# Patient Record
Sex: MALE | Race: WHITE | Employment: PART TIME | URBAN - METROPOLITAN AREA
[De-identification: names, ages, dates, MRNs, and addresses within clinical notes are randomized per-mention and may not be internally consistent; named-entity substitution may affect disease eponyms.]

---

## 2017-05-20 ENCOUNTER — ED HISTORICAL/CONVERTED ENCOUNTER (OUTPATIENT)
Dept: OTHER | Age: 62
End: 2017-05-20

## 2017-05-30 ENCOUNTER — OP HISTORICAL/CONVERTED ENCOUNTER (OUTPATIENT)
Dept: OTHER | Age: 62
End: 2017-05-30

## 2017-06-01 ENCOUNTER — OP HISTORICAL/CONVERTED ENCOUNTER (OUTPATIENT)
Dept: OTHER | Age: 62
End: 2017-06-01

## 2019-10-01 ENCOUNTER — IP HISTORICAL/CONVERTED ENCOUNTER (OUTPATIENT)
Dept: OTHER | Age: 64
End: 2019-10-01

## 2019-10-01 LAB
CREATININE, EXTERNAL: 0.88
HBA1C MFR BLD HPLC: 5.3 %
LDL-C, EXTERNAL: 36
PSA, EXTERNAL: 3.2

## 2019-10-24 ENCOUNTER — OFFICE VISIT (OUTPATIENT)
Dept: ENDOCRINOLOGY | Age: 64
End: 2019-10-24

## 2019-10-24 VITALS
OXYGEN SATURATION: 97 % | SYSTOLIC BLOOD PRESSURE: 137 MMHG | BODY MASS INDEX: 21.42 KG/M2 | DIASTOLIC BLOOD PRESSURE: 77 MMHG | RESPIRATION RATE: 18 BRPM | HEART RATE: 80 BPM | HEIGHT: 63 IN | WEIGHT: 120.9 LBS | TEMPERATURE: 97.3 F

## 2019-10-24 DIAGNOSIS — E04.1 THYROID NODULE: ICD-10-CM

## 2019-10-24 DIAGNOSIS — E05.90 HYPERTHYROIDISM: ICD-10-CM

## 2019-10-24 DIAGNOSIS — E05.90 HYPERTHYROIDISM: Primary | ICD-10-CM

## 2019-10-24 DIAGNOSIS — B19.20 HEPATITIS C VIRUS INFECTION WITHOUT HEPATIC COMA, UNSPECIFIED CHRONICITY: ICD-10-CM

## 2019-10-24 RX ORDER — ALLOPURINOL 100 MG/1
TABLET ORAL
Refills: 0 | COMMUNITY
Start: 2019-10-22 | End: 2020-04-22 | Stop reason: SDUPTHER

## 2019-10-24 RX ORDER — METHIMAZOLE 5 MG/1
TABLET ORAL
Refills: 0 | COMMUNITY
Start: 2019-10-03 | End: 2019-10-24

## 2019-10-24 RX ORDER — DILTIAZEM HYDROCHLORIDE 120 MG/1
CAPSULE, EXTENDED RELEASE ORAL
Refills: 0 | COMMUNITY
Start: 2019-10-03 | End: 2020-12-01

## 2019-10-24 RX ORDER — METHIMAZOLE 5 MG/1
10 TABLET ORAL 2 TIMES DAILY
Qty: 120 TAB | Refills: 6 | Status: SHIPPED | OUTPATIENT
Start: 2019-10-24 | End: 2020-04-22 | Stop reason: SDUPTHER

## 2019-10-24 RX ORDER — OMEPRAZOLE 40 MG/1
CAPSULE, DELAYED RELEASE ORAL
Refills: 0 | COMMUNITY
Start: 2019-09-30 | End: 2020-12-01

## 2019-10-24 RX ORDER — HYDROCORTISONE 25 MG/G
CREAM TOPICAL
Refills: 0 | COMMUNITY
Start: 2019-08-09

## 2019-10-24 RX ORDER — METHIMAZOLE 5 MG/1
10 TABLET ORAL 2 TIMES DAILY
Qty: 60 TAB | Refills: 6 | Status: SHIPPED | OUTPATIENT
Start: 2019-10-24 | End: 2019-10-24 | Stop reason: SDUPTHER

## 2019-10-24 RX ORDER — BETAMETHASONE DIPROPIONATE 0.5 MG/G
OINTMENT TOPICAL
Refills: 1 | COMMUNITY
Start: 2019-08-19

## 2019-10-24 NOTE — PROGRESS NOTES
Room 2    Identified pt with two pt identifiers(name and ). Reviewed record in preparation for visit and have obtained necessary documentation. All patient medications has been reviewed. Chief Complaint   Patient presents with   [address]    Thyroid Problem       Health Maintenance Due   Topic    Hepatitis C Screening     DTaP/Tdap/Td series (1 - Tdap)    Shingrix Vaccine Age 50> (1 of 2)    FOBT Q 1 YEAR AGE 50-75     Influenza Age 5 to Adult        Vitals:    10/24/19 1441   BP: 137/77   Pulse: 80   Resp: 18   Temp: 97.3 °F (36.3 °C)   TempSrc: Oral   SpO2: 97%   Weight: 120 lb 14.4 oz (54.8 kg)   Height: 5' 3\" (1.6 m)   PainSc:   0 - No pain       Wt Readings from Last 3 Encounters:   10/24/19 120 lb 14.4 oz (54.8 kg)     Temp Readings from Last 3 Encounters:   10/24/19 97.3 °F (36.3 °C) (Oral)     BP Readings from Last 3 Encounters:   10/24/19 137/77     Pulse Readings from Last 3 Encounters:   10/24/19 80       No results found for: HBA1C, HGBE8, XBH2QCHO, RFS3CLEE, GMZ5CNFO    Coordination of Care Questionnaire:   1) Have you been to an emergency room, urgent care, or hospitalized since your last visit?   no       2. Have seen or consulted any other health care provider since your last visit? NO    3) Do you have an Advanced Directive/ Living Will in place? NO  If yes, do we have a copy on file NO  If no, would you like information NO    Patient is accompanied by partner I have received verbal consent from Dariel Caicedo to discuss any/all medical information while they are present in the room.

## 2019-10-24 NOTE — LETTER
10/27/19 Patient: Allison Sanderson YOB: 1955 Date of Visit: 10/24/2019 Amena Head MD 
[address] [place] [address] VIA Facsimile: [phone] Dear Amena Head MD, Thank you for referring Mr. Marilu Cohn to [id number] [place] [address] for evaluation. My notes for this consultation are attached. If you have questions, please do not hesitate to call me. I look forward to following your patient along with you. Sincerely, Loreta Jeter MD

## 2019-10-24 NOTE — PROGRESS NOTES
HISTORY OF PRESENT ILLNESS  Danay Hoang is a 62 y.o. male.   HPI  Initial  visit for thyroid nodule   Referred by pcp       Accompanied by wife who is providing better history       Pt has been found to be losing weight and he had diarrhea , severe   Pt lives separate from his wife and son     When labs are checked, found to be hyperthyroid and got started on tapazole 5 mg tid     Pt has hepatitis c - just came to know about it   Yet to start on treatment           Past Medical History:   Diagnosis Date    Hepatitis C     Tumor, thyroid        Social History     Socioeconomic History    Marital status:      Spouse name: Not on file    Number of children: Not on file    Years of education: Not on file    Highest education level: Not on file   Occupational History    Not on file   Social Needs    Financial resource strain: Not on file    Food insecurity:     Worry: Not on file     Inability: Not on file    Transportation needs:     Medical: Not on file     Non-medical: Not on file   Tobacco Use    Smoking status: Former Smoker    Smokeless tobacco: Never Used    Tobacco comment: quit 12 years ago   Substance and Sexual Activity    Alcohol use: Not Currently    Drug use: Yes     Types: Marijuana     Comment: pt states, not often    Sexual activity: Yes     Partners: Female   Lifestyle    Physical activity:     Days per week: Not on file     Minutes per session: Not on file    Stress: Not on file   Relationships    Social connections:     Talks on phone: Not on file     Gets together: Not on file     Attends Congregational service: Not on file     Active member of club or organization: Not on file     Attends meetings of clubs or organizations: Not on file     Relationship status: Not on file    Intimate partner violence:     Fear of current or ex partner: Not on file     Emotionally abused: Not on file     Physically abused: Not on file     Forced sexual activity: Not on file   Other Topics Concern    Not on file   Social History Narrative    Not on file       History reviewed. No pertinent family history. Review of Systems   Constitutional: Negative. HENT: Negative. Eyes: Negative for pain and redness. Respiratory: Negative. Cardiovascular: Negative for chest pain, palpitations and leg swelling. Gastrointestinal: Negative. Negative for constipation. Genitourinary: Negative. Musculoskeletal: Negative for myalgias. Skin: Negative. Neurological: Negative. Endo/Heme/Allergies: Negative. Psychiatric/Behavioral: Negative for depression and memory loss. The patient does not have insomnia. Physical Exam   Constitutional: He is oriented to person, place, and time. He appears well-developed and well-nourished. HENT:   Head: Normocephalic. Eyes: Pupils are equal, round, and reactive to light. Conjunctivae and EOM are normal.   Neck: Normal range of motion. Neck supple. Thyromegaly present. Cardiovascular: Normal rate and regular rhythm. Pulmonary/Chest: Effort normal and breath sounds normal.   Abdominal: Soft. Bowel sounds are normal.   Musculoskeletal: Normal range of motion. Neurological: He is alert and oriented to person, place, and time. He has normal reflexes. Skin: Skin is warm and dry. Psychiatric: He has a normal mood and affect. Reviewed labs     TSH is < 0.006   Free T4 7.55         ASSESSMENT and PLAN    Hyperthyroidism :   Already on tapazole 5 mg tid and pt says he is taking it only twice  A  Day  , even though it was written for tid dosing   Increasing dose to 10 mg bid     Graves disease by clinical information. Reviewed USG. Could not order  thyroid uptake and scan as he is now on tapazole . Educated patient about this disease, adverse effects of untreated hyperthyroidism especially on heart and bone. Discussed options of HAMILTON therapy and anti-thyroid drugs. Pt. Seemed inteested in getting HAMILTON done.  Will have to plan for it later visits     Checking labs in office and will advise about dosage adjustment over phone. Pt. Is  on propranolol       May not participate in vigorous physical activities. lab results reviewed with patient, repeat labs ordered prior to next appointment  orders and follow up discussed with patient   very strongly urged to quit smoking, Patient instructional materials given. Thyromegaly : reviewed usg and found right  lobe nodule to be requiring FNA  usg 10/3/219  from [facility] - there are several ill-defined nodules in right lobe and the largest , measuring 1.41 by 1.08 by 0.97 cm   Solid nodule with no calcifications , wider than tall     We discussed the natural history of thyroid nodules and the fact that only 5% are malignant. I offered her 3 options: a) pursuing a fine needle aspiration of the nodule, b) expectant management with repeat ultrasound in 6 months, or c) surgical removal of the nodule. Given that he is not having any compressive symptoms from the nodule, there is no indication to go straight to surgery. I reassured him that most thyroid cancers are very slow growing and it is unlikely that we will lose anything if we wait 6 months and decide to pursue a needle biopsy at that time. However, given the size of the nodule and some suspicious characteristics on ultrasound, I think it makes most sense to pursue biopsy at this time. he is agreeable to this plan.   - scheduling  ultrasound-guided FNA of dominant nodule      Hepatitis C : yet to start on treatment       > 50 % of time is spent on counseling   Patient voiced understanding her plan of care

## 2020-01-15 ENCOUNTER — HOSPITAL ENCOUNTER (OUTPATIENT)
Dept: LAB | Age: 65
Discharge: HOME | End: 2020-01-15

## 2020-01-15 ENCOUNTER — LAB ONLY (OUTPATIENT)
Dept: ENDOCRINOLOGY | Age: 65
End: 2020-01-15

## 2020-01-15 DIAGNOSIS — E04.1 THYROID NODULE: ICD-10-CM

## 2020-01-15 DIAGNOSIS — E05.90 HYPERTHYROIDISM: Primary | ICD-10-CM

## 2020-01-15 DIAGNOSIS — E05.90 HYPERTHYROIDISM: ICD-10-CM

## 2020-01-15 LAB
ALBUMIN SERPL-MCNC: 3.6 G/DL (ref 3.5–5)
ALBUMIN/GLOB SERPL: 1.1 {RATIO} (ref 1.1–2.2)
ALP SERPL-CCNC: 88 U/L (ref 45–117)
ALT SERPL-CCNC: 18 U/L (ref 12–78)
ANION GAP SERPL CALC-SCNC: 5 MMOL/L (ref 5–15)
AST SERPL-CCNC: 11 U/L (ref 15–37)
BASOPHILS # BLD: 0 K/UL (ref 0–0.1)
BASOPHILS NFR BLD: 1 % (ref 0–1)
BILIRUB SERPL-MCNC: 0.7 MG/DL (ref 0.2–1)
BUN SERPL-MCNC: 16 MG/DL (ref 6–20)
BUN/CREAT SERPL: 15 (ref 12–20)
CALCIUM SERPL-MCNC: 9.4 MG/DL (ref 8.5–10.1)
CHLORIDE SERPL-SCNC: 103 MMOL/L (ref 97–108)
CO2 SERPL-SCNC: 30 MMOL/L (ref 21–32)
CREAT SERPL-MCNC: 1.09 MG/DL (ref 0.7–1.3)
DIFFERENTIAL METHOD BLD: ABNORMAL
EOSINOPHIL # BLD: 0.4 K/UL (ref 0–0.4)
EOSINOPHIL NFR BLD: 7 % (ref 0–7)
ERYTHROCYTE [DISTWIDTH] IN BLOOD BY AUTOMATED COUNT: 13.2 % (ref 11.5–14.5)
GLOBULIN SER CALC-MCNC: 3.4 G/DL (ref 2–4)
GLUCOSE SERPL-MCNC: 137 MG/DL (ref 65–100)
HCT VFR BLD AUTO: 49.8 % (ref 36.6–50.3)
HGB BLD-MCNC: 15.6 G/DL (ref 12.1–17)
IMM GRANULOCYTES # BLD AUTO: 0 K/UL (ref 0–0.04)
IMM GRANULOCYTES NFR BLD AUTO: 0 % (ref 0–0.5)
LYMPHOCYTES # BLD: 1.5 K/UL (ref 0.8–3.5)
LYMPHOCYTES NFR BLD: 27 % (ref 12–49)
MCH RBC QN AUTO: 31.8 PG (ref 26–34)
MCHC RBC AUTO-ENTMCNC: 31.3 G/DL (ref 30–36.5)
MCV RBC AUTO: 101.4 FL (ref 80–99)
MONOCYTES # BLD: 0.6 K/UL (ref 0–1)
MONOCYTES NFR BLD: 10 % (ref 5–13)
NEUTS SEG # BLD: 3 K/UL (ref 1.8–8)
NEUTS SEG NFR BLD: 55 % (ref 32–75)
NRBC # BLD: 0 K/UL (ref 0–0.01)
NRBC BLD-RTO: 0 PER 100 WBC
PLATELET # BLD AUTO: 207 K/UL (ref 150–400)
PMV BLD AUTO: 10.6 FL (ref 8.9–12.9)
POTASSIUM SERPL-SCNC: 4.7 MMOL/L (ref 3.5–5.1)
PROT SERPL-MCNC: 7 G/DL (ref 6.4–8.2)
RBC # BLD AUTO: 4.91 M/UL (ref 4.1–5.7)
SODIUM SERPL-SCNC: 138 MMOL/L (ref 136–145)
T4 FREE SERPL-MCNC: 0.7 NG/DL (ref 0.8–1.5)
TSH SERPL DL<=0.05 MIU/L-ACNC: 3.92 UIU/ML (ref 0.36–3.74)
WBC # BLD AUTO: 5.4 K/UL (ref 4.1–11.1)

## 2020-01-17 LAB — TSH RECEP AB SER-ACNC: 1.65 IU/L (ref 0–1.75)

## 2020-01-22 ENCOUNTER — OFFICE VISIT (OUTPATIENT)
Dept: ENDOCRINOLOGY | Age: 65
End: 2020-01-22

## 2020-01-22 VITALS
DIASTOLIC BLOOD PRESSURE: 74 MMHG | TEMPERATURE: 97.8 F | BODY MASS INDEX: 24.33 KG/M2 | SYSTOLIC BLOOD PRESSURE: 118 MMHG | HEIGHT: 63 IN | WEIGHT: 137.3 LBS | RESPIRATION RATE: 20 BRPM | HEART RATE: 65 BPM | OXYGEN SATURATION: 96 %

## 2020-01-22 DIAGNOSIS — E04.1 THYROID NODULE: ICD-10-CM

## 2020-01-22 DIAGNOSIS — E05.90 HYPERTHYROIDISM: Primary | ICD-10-CM

## 2020-01-22 DIAGNOSIS — B19.20 HEPATITIS C VIRUS INFECTION WITHOUT HEPATIC COMA, UNSPECIFIED CHRONICITY: ICD-10-CM

## 2020-01-22 RX ORDER — GLECAPREVIR AND PIBRENTASVIR 40; 100 MG/1; MG/1
TABLET, FILM COATED ORAL
COMMUNITY
Start: 2020-01-08 | End: 2020-12-01

## 2020-01-22 RX ORDER — METOPROLOL SUCCINATE 25 MG/1
TABLET, EXTENDED RELEASE ORAL DAILY
COMMUNITY
End: 2020-04-22 | Stop reason: SDUPTHER

## 2020-01-22 RX ORDER — FAMOTIDINE 20 MG/1
20 TABLET, FILM COATED ORAL AS NEEDED
COMMUNITY
End: 2022-05-06

## 2020-01-22 NOTE — PROGRESS NOTES
HISTORY OF PRESENT ILLNESS  Erica Thompson is a 62 y.o. male.   HPI  First follow up after Initial  visit for thyroid nodule   From oct 2019   Gained 17 lbs       Taking tapazole as prescribed   Here for thyroid nodule biopsy         Referred by pcp       Accompanied by wife who is providing better history     Pt has been found to be losing weight and he had diarrhea , severe   Pt lives separate from his wife and son     When labs are checked, found to be hyperthyroid and got started on tapazole 5 mg tid     Pt has hepatitis c - just came to know about it   Yet to start on treatment           Past Medical History:   Diagnosis Date    Hepatitis C     Tumor, thyroid        Social History     Socioeconomic History    Marital status:      Spouse name: Not on file    Number of children: Not on file    Years of education: Not on file    Highest education level: Not on file   Occupational History    Not on file   Social Needs    Financial resource strain: Not on file    Food insecurity:     Worry: Not on file     Inability: Not on file    Transportation needs:     Medical: Not on file     Non-medical: Not on file   Tobacco Use    Smoking status: Former Smoker    Smokeless tobacco: Never Used    Tobacco comment: quit 12 years ago   Substance and Sexual Activity    Alcohol use: Not Currently    Drug use: Yes     Types: Marijuana     Comment: pt states, not often    Sexual activity: Yes     Partners: Female   Lifestyle    Physical activity:     Days per week: Not on file     Minutes per session: Not on file    Stress: Not on file   Relationships    Social connections:     Talks on phone: Not on file     Gets together: Not on file     Attends Rastafari service: Not on file     Active member of club or organization: Not on file     Attends meetings of clubs or organizations: Not on file     Relationship status: Not on file    Intimate partner violence:     Fear of current or ex partner: Not on file     Emotionally abused: Not on file     Physically abused: Not on file     Forced sexual activity: Not on file   Other Topics Concern    Not on file   Social History Narrative    Not on file       History reviewed. No pertinent family history. Review of Systems   Constitutional: Negative. HENT: Negative. Eyes: Negative for pain and redness. Respiratory: Negative. Cardiovascular: Negative for chest pain, palpitations and leg swelling. Gastrointestinal: Negative. Negative for constipation. Genitourinary: Negative. Musculoskeletal: Negative for myalgias. Skin: Negative. Neurological: Negative. Endo/Heme/Allergies: Negative. Psychiatric/Behavioral: Negative for depression and memory loss. The patient does not have insomnia. Physical Exam   Constitutional: He is oriented to person, place, and time. He appears well-developed and well-nourished. HENT:   Head: Normocephalic. Eyes: Pupils are equal, round, and reactive to light. Conjunctivae and EOM are normal.   Neck: Normal range of motion. Neck supple. Thyromegaly present. Cardiovascular: Normal rate and regular rhythm. Pulmonary/Chest: Effort normal and breath sounds normal.   Abdominal: Soft. Bowel sounds are normal.   Musculoskeletal: Normal range of motion. Neurological: He is alert and oriented to person, place, and time. He has normal reflexes. Skin: Skin is warm and dry. Psychiatric: He has a normal mood and affect. Reviewed labs     TSH is < 0.006   Free T4 7.55         Lab Results   Component Value Date/Time    TSH 3.92 (H) 01/15/2020 12:58 PM    T4, Free 0.7 (L) 01/15/2020 12:58 PM              ASSESSMENT and PLAN    Hyperthyroidism : Graves disease by clinical information. Reviewed USG. Could not order  thyroid uptake and scan as he is now on tapazole .   Already on tapazole 5 mg tid and pt says he is taking it only twice  A  Day  , even though it was written for tid dosing   Increased  dose to 10 mg bid  In oct 2019    Jan 2020 : he is euthyroid gladly  Decrease dose to 15 mg a day   Pt.  Is  on propranolol         Thyromegaly : reviewed usg and found right  lobe nodule to be requiring FNA  usg 10/3/219  from [facility] - there are several ill-defined nodules in right lobe and the largest , measuring 1.41 by 1.08 by 0.97 cm   Solid nodule with no calcifications , wider than tall   jan 2020- performed the FNA      Hepatitis C : on treatment       > 50 % of time is spent on counseling   Patient voiced understanding her plan of care

## 2020-01-22 NOTE — PROGRESS NOTES
1. Have you been to the ER, urgent care clinic since your last visit? No  Hospitalized since your last visit? No    2. Have you seen or consulted any other health care providers outside of the [address] [place] [address] since your last visit? Include any pap smears or colon screening.  No    Wt Readings from Last 3 Encounters:   01/22/20 137 lb 4.8 oz (62.3 kg)   10/24/19 120 lb 14.4 oz (54.8 kg)     Temp Readings from Last 3 Encounters:   01/22/20 97.8 °F (36.6 °C) (Oral)   10/24/19 97.3 °F (36.3 °C) (Oral)     BP Readings from Last 3 Encounters:   01/22/20 118/74   10/24/19 137/77     Pulse Readings from Last 3 Encounters:   01/22/20 65   10/24/19 80

## 2020-01-22 NOTE — LETTER
1/22/20 Patient: Nuvia Hall YOB: 1955 Date of Visit: 1/22/2020 Jose Christensen MD 
[address] [facility] [address] VIA Facsimile: [phone] Dear Jose Christensen MD, Thank you for referring Mr. Luda Crawford to [id number] [place] [address] for evaluation. My notes for this consultation are attached. If you have questions, please do not hesitate to call me. I look forward to following your patient along with you. Sincerely, Rikki Moran MD

## 2020-01-22 NOTE — PROGRESS NOTES
[facility] DIABETES [facility] ENDOCRINOLOGY  OFFICE PROCEDURE PROGRESS NOTE        Chart reviewed for the following:   Mila Zhang MD, have reviewed the History, Physical and updated the Allergic reactions for [address] performed immediately prior to start of procedure:   Mila Zhang MD, have performed the following reviews on Bridget Cody prior to the start of the procedure:            * Patient was identified by name and date of birth   * Agreement on procedure being performed was verified  * Risks and Benefits explained to the patient  * Procedure site verified and marked as necessary  * Patient was positioned for comfort  * Consent was signed and verified     Time: 315 PM    Pain scale : 0    Date of procedure: 1/22/2020    Procedure performed by:  Agnieszka Samuel MD    Provider assisted by:   Cynthia Buck LPN      Real time imaging was performed in both transverse and sagittal planes    Nodule right   Following informed consent, a procedural pause was held to confirm patiient identity and site of biopsy. After sterile preparation, FNA guidance was performed using direct ultrasound guidance to confirm accurate needle placement. 4 aspirations were made using 25  G needles  Samples were submitted to cytology  Patient  tolerated procedure well without complications  After care instructions provided.       Pain scale post procedure : 4

## 2020-01-22 NOTE — PATIENT INSTRUCTIONS
--------------------------------------------------------------------------------------------    Refills    -    please call your pharmacy and have them send us a refill request    Results  -  allow up to a week for lab results to be processed and reviewed. Phone calls  -  Allow upto 24 hrs.  for non-urgent calls to be retained    Prior authorization - It may take up to 4 weeks to process, depending on your insurance    Forms  -  FMLA, DMV, patient assistance, etc. will take up to 2 weeks to process    Cancellations - please notify the office in advance if you cannot keep your appointment    Samples  - will only be dispensed at visits as supply is limited      If you are having a medical emergency call 911    --------------------------------------------------------------------------------------------      Decrease tapazole to 10 mg AM  And 5 mg PM      Please take tylenol 500 mg or Motrin 400 mg (2 pills of 200 mg dose) OTC,  if there is any biopsy site pain    You can go back to your normal routine immediately    If you notice any dime sized redness, at the biopsy site, it is normal and do not worry     If you have more symptoms and signs requiring emergency assistance,  call 911   or go to Emergency room

## 2020-01-23 LAB
CYTOLOGY TISS FNA DOC CYTO: NORMAL
DX ICD CODE: NORMAL
DX ICD CODE: NORMAL
PATH REPORT.GROSS SPEC: NORMAL
PATH REPORT.SITE OF ORIGIN SPEC: NORMAL
PATHOLOGIST NAME: NORMAL
PERFORMED BY, 191138: NORMAL

## 2020-04-06 ENCOUNTER — TELEPHONE (OUTPATIENT)
Dept: ENDOCRINOLOGY | Age: 65
End: 2020-04-06

## 2020-04-06 DIAGNOSIS — E04.1 THYROID NODULE: ICD-10-CM

## 2020-04-06 DIAGNOSIS — E05.90 HYPERTHYROIDISM: Primary | ICD-10-CM

## 2020-04-21 ENCOUNTER — VIRTUAL VISIT (OUTPATIENT)
Dept: ENDOCRINOLOGY | Age: 65
End: 2020-04-21

## 2020-04-21 DIAGNOSIS — E04.1 THYROID NODULE: Primary | ICD-10-CM

## 2020-04-21 DIAGNOSIS — E05.90 HYPERTHYROIDISM: ICD-10-CM

## 2020-04-21 DIAGNOSIS — B19.20 HEPATITIS C VIRUS INFECTION WITHOUT HEPATIC COMA, UNSPECIFIED CHRONICITY: ICD-10-CM

## 2020-04-21 NOTE — PROGRESS NOTES
Ana Wilkinson is a 60 y.o. male evaluated via telephone on 4/21/2020. Consent:  He and/or health care decision maker is aware that that he may receive a bill for this telephone service, depending on his insurance coverage, and has provided verbal consent to proceed: Yes    HISTORY OF PRESENT ILLNESS  Ana Wilkinson is a 60 y.o. male. HPI  First follow up after Initial  visit for thyroid nodule and hyper thyroidism from jan 22 2020    Patient was told on the visit of biopsy in January, to reduce the Tapazole to 15 mg a day from 20 mg a day.   But patient still continues to be on 20 mg a day of Tapazole  He does shift away from the main topic of discussion    He did express concern for the persisting thyroid nodule in his neck despite the negative thyroid cancer      Old history   Gained 17 lbs   Taking tapazole as prescribed   Here for thyroid nodule biopsy         Referred by pcp       Accompanied by wife who is providing better history     Pt has been found to be losing weight and he had diarrhea , severe   Pt lives separate from his wife and son     When labs are checked, found to be hyperthyroid and got started on tapazole 5 mg tid     Pt has hepatitis c - just came to know about it   Yet to start on treatment           Past Medical History:   Diagnosis Date    Hepatitis C     Tumor, thyroid        Social History     Socioeconomic History    Marital status:      Spouse name: Not on file    Number of children: Not on file    Years of education: Not on file    Highest education level: Not on file   Occupational History    Not on file   Social Needs    Financial resource strain: Not on file    Food insecurity     Worry: Not on file     Inability: Not on file    Transportation needs     Medical: Not on file     Non-medical: Not on file   Tobacco Use    Smoking status: Former Smoker    Smokeless tobacco: Never Used    Tobacco comment: quit 12 years ago   Substance and Sexual Activity    Alcohol use: Not Currently    Drug use: Yes     Types: Marijuana     Comment: pt states, not often    Sexual activity: Yes     Partners: Female   Lifestyle    Physical activity     Days per week: Not on file     Minutes per session: Not on file    Stress: Not on file   Relationships    Social connections     Talks on phone: Not on file     Gets together: Not on file     Attends Synagogue service: Not on file     Active member of club or organization: Not on file     Attends meetings of clubs or organizations: Not on file     Relationship status: Not on file    Intimate partner violence     Fear of current or ex partner: Not on file     Emotionally abused: Not on file     Physically abused: Not on file     Forced sexual activity: Not on file   Other Topics Concern    Not on file   Social History Narrative    Not on file       History reviewed. No pertinent family history. Review of Systems   Constitutional: Negative. HENT: Negative. Eyes: Negative for pain and redness. Respiratory: Negative. Cardiovascular: Negative for chest pain, palpitations and leg swelling. Gastrointestinal: Negative. Negative for constipation. Genitourinary: Negative. Musculoskeletal: Negative for myalgias. Skin: Negative. Neurological: Negative. Endo/Heme/Allergies: Negative. Psychiatric/Behavioral: Negative for depression and memory loss. The patient does not have insomnia. Physical Exam   Constitutional: He is oriented to person, place, and time. He appears well-developed and well-nourished. HENT:   Head: Normocephalic. Eyes: Pupils are equal, round, and reactive to light. Conjunctivae and EOM are normal.   Neck: Normal range of motion. Neck supple. Thyromegaly present. Cardiovascular: Normal rate and regular rhythm. Pulmonary/Chest: Effort normal and breath sounds normal.   Abdominal: Soft. Bowel sounds are normal.   Musculoskeletal: Normal range of motion.    Neurological: He is alert and oriented to person, place, and time. He has normal reflexes. Skin: Skin is warm and dry. Psychiatric: He has a normal mood and affect. Reviewed labs       Lab Results   Component Value Date/Time    TSH 3.92 (H) 01/15/2020 12:58 PM    T4, Free 0.7 (L) 01/15/2020 12:58 PM              ASSESSMENT and PLAN    Hyperthyroidism : Graves disease by clinical information. Reviewed USG. Could not order  thyroid uptake and scan as he is now on tapazole . Already on tapazole 5 mg tid and pt says he is taking it only twice  A  Day  , even though it was written for tid dosing   Increased  dose to 10 mg bid  In oct 2019    Jan 2020 : he is euthyroid gladly  Decrease  Tapazole dose to 15 mg a day ( TSH  Is  3.92 ), but  Pt  Did not change the dosing   Pt. Is  on  metoprolol       April 2020:   No labs   Again reminded him of my change on Tapazole 3 months ago which he did not follow  Decrease tapazole to 10 mg AM  And  5 mg PM          Thyromegaly : reviewed usg and found right  lobe nodule to be requiring FNA  usg 10/3/219  from [facility] - there are several ill-defined nodules in right lobe and the largest , measuring 1.41 by 1.08 by 0.97 cm   Solid nodule with no calcifications , wider than tall   jan 2020- performed the FNA -  Benign path       Hepatitis C : on treatment     Phone call  11  min       Pursuant  To the Emergency declaration under the [address] and the [address] authority and the [facility], to reduce the patient's risk of exposure to  COVID-19 and provide continuity of care for an established patient.             > 50 % of time is spent on counseling   Patient voiced understanding her plan of care

## 2020-04-21 NOTE — PROGRESS NOTES
1. Have you been to the ER, urgent care clinic since your last visit? No  Hospitalized since your last visit? No    2. Have you seen or consulted any other health care providers outside of the [address] [place] [address] since your last visit? Include any pap smears or colon screening.  No

## 2020-04-22 DIAGNOSIS — E05.90 HYPERTHYROIDISM: ICD-10-CM

## 2020-04-22 RX ORDER — ALLOPURINOL 100 MG/1
100 TABLET ORAL DAILY
Qty: 30 TAB | Refills: 6 | Status: SHIPPED | OUTPATIENT
Start: 2020-04-22

## 2020-04-22 RX ORDER — METHIMAZOLE 5 MG/1
TABLET ORAL
Qty: 90 TAB | Refills: 6 | Status: SHIPPED | OUTPATIENT
Start: 2020-04-22 | End: 2021-04-06

## 2020-04-22 RX ORDER — METOPROLOL SUCCINATE 25 MG/1
25 TABLET, EXTENDED RELEASE ORAL DAILY
Qty: 30 TAB | Refills: 6 | Status: SHIPPED | OUTPATIENT
Start: 2020-04-22

## 2020-12-01 ENCOUNTER — OFFICE VISIT (OUTPATIENT)
Dept: ENDOCRINOLOGY | Age: 65
End: 2020-12-01
Payer: COMMERCIAL

## 2020-12-01 VITALS
HEART RATE: 67 BPM | TEMPERATURE: 98.6 F | DIASTOLIC BLOOD PRESSURE: 79 MMHG | BODY MASS INDEX: 26.12 KG/M2 | OXYGEN SATURATION: 98 % | WEIGHT: 147.4 LBS | HEIGHT: 63 IN | RESPIRATION RATE: 18 BRPM | SYSTOLIC BLOOD PRESSURE: 145 MMHG

## 2020-12-01 DIAGNOSIS — B19.20 HEPATITIS C VIRUS INFECTION WITHOUT HEPATIC COMA, UNSPECIFIED CHRONICITY: ICD-10-CM

## 2020-12-01 DIAGNOSIS — E05.90 HYPERTHYROIDISM: Primary | ICD-10-CM

## 2020-12-01 DIAGNOSIS — E04.1 THYROID NODULE: ICD-10-CM

## 2020-12-01 LAB
T4 FREE SERPL-MCNC: 0.8 NG/DL (ref 0.8–1.5)
TSH SERPL DL<=0.05 MIU/L-ACNC: 7.28 UIU/ML (ref 0.36–3.74)

## 2020-12-01 PROCEDURE — 99214 OFFICE O/P EST MOD 30 MIN: CPT | Performed by: INTERNAL MEDICINE

## 2020-12-01 NOTE — LETTER
12/3/20 Patient: Shivam Lomeli YOB: 1955 Date of Visit: 12/1/2020 Luis Kowalski MD 
[place] [address] [address] [facility] [address] VIA In Basket Dear Luis Kowalski MD, Thank you for referring Mr. Stan Houston to [address] for evaluation. My notes for this consultation are attached. If you have questions, please do not hesitate to call me. I look forward to following your patient along with you. Sincerely, Maris Townsend MD

## 2020-12-01 NOTE — PROGRESS NOTES
HISTORY OF PRESENT ILLNESS  Arik Reardon is a 60 y.o. male. HPI   follow up after last   visit for thyroid nodule and hyper thyroidism from April 2020      Not a great historian  Staying compliant with Tapazole  Gained 10 lbs since jan 2020 April video visit :     Patient was told on the visit of biopsy in January, to reduce the Tapazole to 15 mg a day from 20 mg a day.   But patient still continues to be on 20 mg a day of Tapazole  He does shift away from the main topic of discussion  He did express concern for the persisting thyroid nodule in his neck despite the negative thyroid cancer      Old history   Gained 17 lbs   Taking tapazole as prescribed   Here for thyroid nodule biopsy         Referred by pcp       Accompanied by wife who is providing better history   Pt has been found to be losing weight and he had diarrhea , severe   Pt lives separate from his wife and son   When labs are checked, found to be hyperthyroid and got started on tapazole 5 mg tid   Pt has hepatitis c - just came to know about it   Yet to start on treatment           Past Medical History:   Diagnosis Date    Hepatitis C     Tumor, thyroid        Social History     Socioeconomic History    Marital status:      Spouse name: Not on file    Number of children: Not on file    Years of education: Not on file    Highest education level: Not on file   Occupational History    Not on file   Social Needs    Financial resource strain: Not on file    Food insecurity     Worry: Not on file     Inability: Not on file    Transportation needs     Medical: Not on file     Non-medical: Not on file   Tobacco Use    Smoking status: Former Smoker    Smokeless tobacco: Never Used    Tobacco comment: quit 12 years ago   Substance and Sexual Activity    Alcohol use: Not Currently    Drug use: Yes     Types: Marijuana     Comment: pt states, not often    Sexual activity: Yes     Partners: Female   Lifestyle    Physical activity     Days per week: Not on file     Minutes per session: Not on file    Stress: Not on file   Relationships    Social connections     Talks on phone: Not on file     Gets together: Not on file     Attends Hinduism service: Not on file     Active member of club or organization: Not on file     Attends meetings of clubs or organizations: Not on file     Relationship status: Not on file    Intimate partner violence     Fear of current or ex partner: Not on file     Emotionally abused: Not on file     Physically abused: Not on file     Forced sexual activity: Not on file   Other Topics Concern    Not on file   Social History Narrative    Not on file       History reviewed. No pertinent family history. Review of Systems   Constitutional: Negative. HENT: Negative. Eyes: Negative for pain and redness. Respiratory: Negative. Cardiovascular: Negative for chest pain, palpitations and leg swelling. Gastrointestinal: Negative. Negative for constipation. Genitourinary: Negative. Musculoskeletal: Negative for myalgias. Skin: Negative. Neurological: Negative. Endo/Heme/Allergies: Negative. Psychiatric/Behavioral: Negative for depression and memory loss. The patient does not have insomnia. Physical Exam   Constitutional: He is oriented to person, place, and time. He appears well-developed and well-nourished. HENT:   Head: Normocephalic. Eyes: Pupils are equal, round, and reactive to light. Conjunctivae and EOM are normal.   Neck: Normal range of motion. Neck supple. Thyromegaly present. Cardiovascular: Normal rate and regular rhythm. Pulmonary/Chest: Effort normal and breath sounds normal.   Abdominal: Soft. Bowel sounds are normal.   Musculoskeletal: Normal range of motion. Neurological: He is alert and oriented to person, place, and time. He has normal reflexes. Skin: Skin is warm and dry. Psychiatric: He has a normal mood and affect.        Reviewed labs   None New         ASSESSMENT and PLAN    1. Hyperthyroidism : Graves disease by clinical information. Reviewed [facility]. Could not order  thyroid uptake and scan as he is now on tapazole . Already on tapazole 5 mg tid and pt says he is taking it only twice  A  Day  , even though it was written for tid dosing   Increased  dose to 10 mg bid  In oct 2019    Jan 2020 : he is euthyroid gladly  Decrease  Tapazole dose to 15 mg a day ( TSH  Is  3.92 ), but  Pt  Did not change the dosing   Pt. Is  on  metoprolol       April 2020:   No labs   Again reminded him of my change on Tapazole 3 months ago which he did not follow  Decrease tapazole to 10 mg AM  And  5 mg PM      Nov 2020   On tapazole 5 mg dose 2 pills AM  And one pill pm   Obtaining labs today and will advise him about any changes  Counseled him to have the labs done before the visit itself          2.   Thyromegaly : reviewed usg and found right  lobe nodule to be requiring FNA  usg 10/3/219  from [facility] - there are several ill-defined nodules in right lobe and the largest , measuring 1.41 by 1.08 by 0.97 cm   Solid nodule with no calcifications , wider than tall   jan 2020- performed the FNA -  Benign path       Hepatitis C : on treatment     > 50 % of time is spent on counseling   Patient voiced understanding her plan of care

## 2020-12-01 NOTE — PROGRESS NOTES
1. Have you been to the ER, urgent care clinic since your last visit? No  Hospitalized since your last visit? No    2. Have you seen or consulted any other health care providers outside of the [address] [place] [address] since your last visit? Include any pap smears or colon screening.  No    Wt Readings from Last 3 Encounters:   12/01/20 147 lb 6.4 oz (66.9 kg)   01/22/20 137 lb 4.8 oz (62.3 kg)   10/24/19 120 lb 14.4 oz (54.8 kg)     Temp Readings from Last 3 Encounters:   12/01/20 98.6 °F (37 °C) (Oral)   01/22/20 97.8 °F (36.6 °C) (Oral)   10/24/19 97.3 °F (36.3 °C) (Oral)     BP Readings from Last 3 Encounters:   12/01/20 (!) 145/79   01/22/20 118/74   10/24/19 137/77     Pulse Readings from Last 3 Encounters:   12/01/20 67   01/22/20 65   10/24/19 80

## 2020-12-03 NOTE — PROGRESS NOTES
Inform patient to change his Tapazole also called his methimazole   5 mg dose   to 1 pill in the morning and continue 1 pill in the evening

## 2020-12-03 NOTE — PROGRESS NOTES
Two pt identifiers confirmed. Informed pt per Dr. Austin Wiggins, Inform patient to change his Tapazole also called his methimazole   5 mg dose   to 1 pill in the morning and continue 1 pill in the evening. Pt verbalized understanding of information discussed w/ no further questions at this time.

## 2021-02-17 ENCOUNTER — HOSPITAL ENCOUNTER (OUTPATIENT)
Dept: ULTRASOUND IMAGING | Age: 66
Discharge: HOME | End: 2021-02-17
Attending: INTERNAL MEDICINE
Payer: COMMERCIAL

## 2021-02-17 DIAGNOSIS — E04.1 THYROID NODULE: ICD-10-CM

## 2021-02-17 PROCEDURE — 76536 US EXAM OF HEAD AND NECK: CPT

## 2021-03-09 ENCOUNTER — HOSPITAL ENCOUNTER (EMERGENCY)
Age: 66
Discharge: HOME | End: 2021-03-09
Payer: COMMERCIAL

## 2021-03-09 ENCOUNTER — APPOINTMENT (OUTPATIENT)
Dept: CT IMAGING | Age: 66
End: 2021-03-09
Attending: PHYSICIAN ASSISTANT
Payer: COMMERCIAL

## 2021-03-09 ENCOUNTER — APPOINTMENT (OUTPATIENT)
Dept: GENERAL RADIOLOGY | Age: 66
End: 2021-03-09
Attending: EMERGENCY MEDICINE
Payer: COMMERCIAL

## 2021-03-09 VITALS
DIASTOLIC BLOOD PRESSURE: 86 MMHG | WEIGHT: 142 LBS | HEART RATE: 58 BPM | RESPIRATION RATE: 23 BRPM | HEIGHT: 63 IN | TEMPERATURE: 98.5 F | BODY MASS INDEX: 25.16 KG/M2 | OXYGEN SATURATION: 100 % | SYSTOLIC BLOOD PRESSURE: 147 MMHG

## 2021-03-09 DIAGNOSIS — R42 DIZZINESS: ICD-10-CM

## 2021-03-09 DIAGNOSIS — H81.10 BPPV (BENIGN PAROXYSMAL POSITIONAL VERTIGO), UNSPECIFIED LATERALITY: Primary | ICD-10-CM

## 2021-03-09 LAB
ALBUMIN SERPL-MCNC: 4.2 G/DL (ref 3.5–5)
ALBUMIN/GLOB SERPL: 1 {RATIO} (ref 1.1–2.2)
ALP SERPL-CCNC: 80 U/L (ref 45–117)
ALT SERPL-CCNC: 25 U/L (ref 12–78)
ANION GAP SERPL CALC-SCNC: 10 MMOL/L (ref 5–15)
AST SERPL W P-5'-P-CCNC: 19 U/L (ref 15–37)
ATRIAL RATE: 62 BPM
BASOPHILS # BLD: 0 K/UL (ref 0–0.1)
BASOPHILS NFR BLD: 0 % (ref 0–1)
BILIRUB SERPL-MCNC: 0.4 MG/DL (ref 0.2–1)
BUN SERPL-MCNC: 18 MG/DL (ref 6–20)
BUN/CREAT SERPL: 15 (ref 12–20)
CA-I BLD-MCNC: 9.4 MG/DL (ref 8.5–10.1)
CALCULATED P AXIS, ECG09: 43 DEGREES
CALCULATED R AXIS, ECG10: 27 DEGREES
CALCULATED T AXIS, ECG11: 45 DEGREES
CHLORIDE SERPL-SCNC: 102 MMOL/L (ref 97–108)
CO2 SERPL-SCNC: 26 MMOL/L (ref 21–32)
CREAT SERPL-MCNC: 1.24 MG/DL (ref 0.7–1.3)
DIAGNOSIS, 93000: NORMAL
DIFFERENTIAL METHOD BLD: NORMAL
EOSINOPHIL # BLD: 0.3 K/UL (ref 0–0.4)
EOSINOPHIL NFR BLD: 4 % (ref 0–7)
ERYTHROCYTE [DISTWIDTH] IN BLOOD BY AUTOMATED COUNT: 13.4 % (ref 11.5–14.5)
GLOBULIN SER CALC-MCNC: 4.1 G/DL (ref 2–4)
GLUCOSE SERPL-MCNC: 88 MG/DL (ref 65–100)
HCT VFR BLD AUTO: 44 % (ref 36.6–50.3)
HGB BLD-MCNC: 14.7 G/DL (ref 12.1–17)
IMM GRANULOCYTES # BLD AUTO: 0 K/UL (ref 0–0.04)
IMM GRANULOCYTES NFR BLD AUTO: 0 % (ref 0–0.5)
LYMPHOCYTES # BLD: 2.2 K/UL (ref 0.8–3.5)
LYMPHOCYTES NFR BLD: 33 % (ref 12–49)
MCH RBC QN AUTO: 32.2 PG (ref 26–34)
MCHC RBC AUTO-ENTMCNC: 33.4 G/DL (ref 30–36.5)
MCV RBC AUTO: 96.5 FL (ref 80–99)
MONOCYTES # BLD: 0.8 K/UL (ref 0–1)
MONOCYTES NFR BLD: 11 % (ref 5–13)
NEUTS SEG # BLD: 3.5 K/UL (ref 1.8–8)
NEUTS SEG NFR BLD: 52 % (ref 32–75)
P-R INTERVAL, ECG05: 170 MS
PLATELET # BLD AUTO: 214 K/UL (ref 150–400)
PMV BLD AUTO: 10.4 FL (ref 8.9–12.9)
POTASSIUM SERPL-SCNC: 4.4 MMOL/L (ref 3.5–5.1)
PROT SERPL-MCNC: 8.3 G/DL (ref 6.4–8.2)
Q-T INTERVAL, ECG07: 380 MS
QRS DURATION, ECG06: 84 MS
QTC CALCULATION (BEZET), ECG08: 385 MS
RBC # BLD AUTO: 4.56 M/UL (ref 4.1–5.7)
SODIUM SERPL-SCNC: 138 MMOL/L (ref 136–145)
TROPONIN I SERPL-MCNC: <0.05 NG/ML
TSH SERPL DL<=0.05 MIU/L-ACNC: 3.46 UIU/ML (ref 0.36–3.74)
VENTRICULAR RATE, ECG03: 62 BPM
WBC # BLD AUTO: 6.7 K/UL (ref 4.1–11.1)

## 2021-03-09 PROCEDURE — 74011250636 HC RX REV CODE- 250/636: Performed by: PHYSICIAN ASSISTANT

## 2021-03-09 PROCEDURE — 84443 ASSAY THYROID STIM HORMONE: CPT

## 2021-03-09 PROCEDURE — 99284 EMERGENCY DEPT VISIT MOD MDM: CPT

## 2021-03-09 PROCEDURE — 84484 ASSAY OF TROPONIN QUANT: CPT

## 2021-03-09 PROCEDURE — 80053 COMPREHEN METABOLIC PANEL: CPT

## 2021-03-09 PROCEDURE — 71045 X-RAY EXAM CHEST 1 VIEW: CPT

## 2021-03-09 PROCEDURE — 85025 COMPLETE CBC W/AUTO DIFF WBC: CPT

## 2021-03-09 PROCEDURE — 93005 ELECTROCARDIOGRAM TRACING: CPT

## 2021-03-09 PROCEDURE — 70450 CT HEAD/BRAIN W/O DYE: CPT

## 2021-03-09 PROCEDURE — 36415 COLL VENOUS BLD VENIPUNCTURE: CPT

## 2021-03-09 RX ORDER — MECLIZINE HYDROCHLORIDE 25 MG/1
25 TABLET ORAL
Qty: 30 TAB | Refills: 0 | Status: SHIPPED | OUTPATIENT
Start: 2021-03-09 | End: 2021-03-19

## 2021-03-09 RX ORDER — MECLIZINE HYDROCHLORIDE 25 MG/1
25 TABLET ORAL
Status: COMPLETED | OUTPATIENT
Start: 2021-03-09 | End: 2021-03-09

## 2021-03-09 RX ADMIN — SODIUM CHLORIDE 1000 ML: 9 INJECTION, SOLUTION INTRAVENOUS at 14:30

## 2021-03-09 RX ADMIN — MECLIZINE HYDROCHLORIDE 25 MG: 25 TABLET ORAL at 14:30

## 2021-03-09 NOTE — ED TRIAGE NOTES
GCS 15 pt has been having increasing dizziness for the last week; denies CP, n/v; pt stated that he has been taking his medications as prescribed

## 2021-03-09 NOTE — ED NOTES
Pt alert and oriented x 4. Gcs 15. Discharge instructions given and reviewed by writer. Pt ambulated out of ED. No concerns voiced. No signs of acute distress noted.

## 2021-03-09 NOTE — ED NOTES
SBIRT CONSULTATION    Patient was given a universal screening for depression and substance use. Pt screened positive on the DAST-10 with a score of 3. Pt endorsed the almost regular use of marijuana which he has been using for many years. Pt reports that he finds marijuana to be helpful in allowing him to sleep better at night and that he cannot really see any negative affects from his use. Pt reports that his wife does not like his using marijuana and that his use has largely contributed to their separation, however they remain on good terms. Pt also stated that he sometimes feels guilty about his use but that he has never felt guilty enough to quit. Pt was made aware of the risks to his health that are involved with his use. Pt voiced an understanding of these risks and stated that he would keep these concerns in mind. Pt and writer also discussed the safety of where he purchases his marijuana and the risks that he takes whenever he chooses to buy. Pt stated that he understood the risks and that because of this, the pt only purchases from 2 sources and will not buy elsewhere. The pt stated that he is 5 out of 10 on the readiness ruler for wanting to quit. Pt stated that he is \"half and half\" when it comes to quitting because he feels that he benefits greatly from his use. On the other hand, the pt understand that his use has caused more of a rift between he and his wife because she states that she dose not like how he behaves when he smokes. Patient participated in the [facility] and was calm and polite with the writer. Pt did not require resources at this time.

## 2021-03-09 NOTE — LETTER
Jagjit Hong was seen and treated in our emergency department on 3/9/2021. He may return to work on 3/13/2021 If you have any questions or concerns, please don't hesitate to call.  
 
 
Alec Grimaldo PA-C

## 2021-03-09 NOTE — ED PROVIDER NOTES
EMERGENCY DEPARTMENT HISTORY AND PHYSICAL EXAM      Date: 3/9/2021  Patient Name: Wagner Kimble    History of Presenting Illness     Chief Complaint   Patient presents with    Dizziness       History Provided By: Patient    HPI: Wagner Kimble, 60 y.o. male with a past medical history significant gout, HTN, hyperthyroidism presents to the ED with cc of intermittent episodes of dizziness x4 days, seems to be positional, related to slipping over on his side while laying in bed as well as bending down towards his feet at work today. Patient also states that when he got to work today, he felt dizzy walking in the door and almost lost his balance and his coworkers became concerned. Patient does admit to occasional cocaine use and occasional marijuana use. He stopped smoking about 12 years ago. He reports compliance with all medications. He specifically denies recent head injury, neck pain, chest pain, shortness of breath, palpitations, syncope, abdominal pain, nausea, vomiting, diarrhea, recent travel, fever. There are no other complaints, changes, or physical findings at this time. PCP: Re Coles MD    Current Outpatient Medications   Medication Sig Dispense Refill    meclizine (ANTIVERT) 25 mg tablet Take 1 Tab by mouth three (3) times daily as needed for Dizziness for up to 10 days. 30 Tab 0    allopurinoL (ZYLOPRIM) 100 mg tablet Take 1 Tab by mouth daily. 30 Tab 6    metoprolol succinate (TOPROL-XL) 25 mg XL tablet Take 1 Tab by mouth daily. 30 Tab 6    methIMAzole (TAPAZOLE) 5 mg tablet Change to 2 tabs (10 mg) in the morning and 1 tab (5 mg) in the evenings 90 Tab 6    famotidine (PEPCID) 20 mg tablet Take 20 mg by mouth as needed.  hydrocortisone (HYTONE) 2.5 % topical cream APPLY TO AFFECTED AREA TWICE A DAY  0    betamethasone dipropionate (DIPROLENE) 0.05 % ointment APPLY A THIN FILM TO THE AFFECTED AREAS ON THE ARMS TWICE DAILY X 2 WEEKS.  DO NOT APPLY TO THE FACE  1 Past History     Past Medical History:  Past Medical History:   Diagnosis Date    Hepatitis C     Tumor, thyroid        Past Surgical History:  No past surgical history on file. Family History:  No family history on file. Social History:  Social History     Tobacco Use    Smoking status: Former Smoker    Smokeless tobacco: Never Used    Tobacco comment: quit 12 years ago   Substance Use Topics    Alcohol use: Not Currently    Drug use: Yes     Types: Marijuana     Comment: pt states, not often       Allergies:  No Known Allergies      Review of Systems   Review of Systems   Constitutional: Negative for activity change, chills and fever. HENT: Negative for congestion, ear pain, rhinorrhea and trouble swallowing. Eyes: Negative for pain and visual disturbance. Respiratory: Negative for cough and shortness of breath. Cardiovascular: Negative for chest pain and palpitations. Gastrointestinal: Negative for abdominal pain, diarrhea, nausea and vomiting. Genitourinary: Negative for decreased urine volume, difficulty urinating, dysuria and hematuria. Musculoskeletal: Negative for arthralgias and myalgias. Skin: Negative for rash. Neurological: Positive for dizziness and light-headedness. Negative for syncope, weakness and headaches. Hematological: Negative for adenopathy. Psychiatric/Behavioral: The patient is not nervous/anxious. All other systems reviewed and are negative. Physical Exam   Physical Exam  Vitals signs and nursing note reviewed. Constitutional:       General: He is not in acute distress. Appearance: He is well-developed. He is not ill-appearing or diaphoretic. HENT:      Head: Normocephalic and atraumatic. Right Ear: Tympanic membrane normal.      Left Ear: Tympanic membrane normal.      Mouth/Throat:      Mouth: Mucous membranes are moist.   Eyes:      Extraocular Movements: Extraocular movements intact.       Pupils: Pupils are equal, round, and reactive to light. Cardiovascular:      Rate and Rhythm: Normal rate and regular rhythm. Pulses: Normal pulses. Heart sounds: Normal heart sounds. Pulmonary:      Effort: Pulmonary effort is normal. No respiratory distress. Breath sounds: Normal breath sounds. Abdominal:      General: Abdomen is flat. Bowel sounds are normal.      Palpations: Abdomen is soft. Tenderness: There is no abdominal tenderness. Musculoskeletal:      Right lower leg: No edema. Left lower leg: No edema. Skin:     General: Skin is warm and dry. Capillary Refill: Capillary refill takes less than 2 seconds. Findings: No rash. Neurological:      General: No focal deficit present. Mental Status: He is alert and oriented to person, place, and time. GCS: GCS eye subscore is 4. GCS verbal subscore is 5. GCS motor subscore is 6. Cranial Nerves: No cranial nerve deficit. Sensory: Sensation is intact. Motor: Motor function is intact. Coordination: Coordination is intact. Gait: Gait is intact. Psychiatric:         Mood and Affect: Mood normal.         Behavior: Behavior normal.         Diagnostic Study Results     Labs -     Recent Results (from the past 48 hour(s))   CBC WITH AUTOMATED DIFF    Collection Time: 03/09/21 12:45 PM   Result Value Ref Range    WBC 6.7 4.1 - 11.1 K/uL    RBC 4.56 4.10 - 5.70 M/uL    HGB 14.7 12.1 - 17.0 g/dL    HCT 44.0 36.6 - 50.3 %    MCV 96.5 80.0 - 99.0 FL    MCH 32.2 26.0 - 34.0 PG    MCHC 33.4 30.0 - 36.5 g/dL    RDW 13.4 11.5 - 14.5 %    PLATELET 862 412 - 536 K/uL    MPV 10.4 8.9 - 12.9 FL    NEUTROPHILS 52 32 - 75 %    LYMPHOCYTES 33 12 - 49 %    MONOCYTES 11 5 - 13 %    EOSINOPHILS 4 0 - 7 %    BASOPHILS 0 0 - 1 %    IMMATURE GRANULOCYTES 0 0.0 - 0.5 %    ABS. NEUTROPHILS 3.5 1.8 - 8.0 K/UL    ABS. LYMPHOCYTES 2.2 0.8 - 3.5 K/UL    ABS. MONOCYTES 0.8 0.0 - 1.0 K/UL    ABS. EOSINOPHILS 0.3 0.0 - 0.4 K/UL    ABS.  BASOPHILS 0.0 0.0 - 0.1 K/UL    ABS. IMM. GRANS. 0.0 0.00 - 0.04 K/UL    DF AUTOMATED     METABOLIC PANEL, COMPREHENSIVE    Collection Time: 03/09/21 12:45 PM   Result Value Ref Range    Sodium 138 136 - 145 mmol/L    Potassium 4.4 3.5 - 5.1 mmol/L    Chloride 102 97 - 108 mmol/L    CO2 26 21 - 32 mmol/L    Anion gap 10 5 - 15 mmol/L    Glucose 88 65 - 100 mg/dL    BUN 18 6 - 20 mg/dL    Creatinine 1.24 0.70 - 1.30 mg/dL    BUN/Creatinine ratio 15 12 - 20      GFR est AA >60 >60 ml/min/1.73m2    GFR est non-AA 59 (L) >60 ml/min/1.73m2    Calcium 9.4 8.5 - 10.1 mg/dL    Bilirubin, total 0.4 0.2 - 1.0 mg/dL    AST (SGOT) 19 15 - 37 U/L    ALT (SGPT) 25 12 - 78 U/L    Alk. phosphatase 80 45 - 117 U/L    Protein, total 8.3 (H) 6.4 - 8.2 g/dL    Albumin 4.2 3.5 - 5.0 g/dL    Globulin 4.1 (H) 2.0 - 4.0 g/dL    A-G Ratio 1.0 (L) 1.1 - 2.2     TROPONIN I    Collection Time: 03/09/21 12:45 PM   Result Value Ref Range    Troponin-I, Qt. <0.05 <0.05 ng/mL   TSH 3RD GENERATION    Collection Time: 03/09/21 12:45 PM   Result Value Ref Range    TSH 3.46 0.36 - 3.74 uIU/mL   EKG, 12 LEAD, INITIAL    Collection Time: 03/09/21 12:52 PM   Result Value Ref Range    Ventricular Rate 62 BPM    Atrial Rate 62 BPM    P-R Interval 170 ms    QRS Duration 84 ms    Q-T Interval 380 ms    QTC Calculation (Bezet) 385 ms    Calculated P Axis 43 degrees    Calculated R Axis 27 degrees    Calculated T Axis 45 degrees    Diagnosis       Normal sinus rhythm  Normal ECG  When compared with ECG of 01-OCT-2019 08:37,  QT has shortened  Confirmed by Eva Kearney MD, Amber Mckeon ([id number]) on 3/9/2021 1:17:48 PM         Radiologic Studies -   XR Results (most recent):  Results from Hospital Encounter encounter on 03/09/21   XR CHEST PORT    Narrative 1 view comparison September 30      Impression The cardiomediastinal silhouette is appropriate for age, technique,  and lung expansion. Pulmonary vasculature is not congested. The lungs are  essentially clear.  No effusion or pneumothorax is seen.        CT Results  (Last 48 hours)               03/09/21 1313  CT HEAD WO CONT Final result    Impression:  Age-appropriate atrophy. No acute findings. Narrative:  CT dose reduction was achieved through use of a standardized protocol tailored   for this examination and automatic exposure control for dose modulation. CT Head       History:        The sulci are prominent but symmetric. Periventricular and deep white matter   hypodensity is perhaps greater than expected for age. No acute abnormality seen   gray or white matter. Ventricles are symmetric and appropriate for atrophy. There is no midline shift or mass effect, or evidence of hemorrhage. Bone   windows show no fracture. Medical Decision Making and ED Course   I am the first provider for this patient. I reviewed the vital signs, available nursing notes, past medical history, past surgical history, family history and social history. Vital Signs-Reviewed the patient's vital signs. Patient Vitals for the past 12 hrs:   Temp Pulse Resp BP SpO2   03/09/21 1458 98.5 °F (36.9 °C) (!) 58 23 (!) 147/86 100 %   03/09/21 1232 98.4 °F (36.9 °C) 68 20 (!) 174/95 100 %       EKG interpretation: (Preliminary)  NSR at 62 bpm, normal EKG, read by Dr. Rachelle Schumacher at 1213    Records Reviewed: Nursing Notes    Provider Notes (Medical Decision Making):       MDM  Number of Diagnoses or Management Options  BPPV (benign paroxysmal positional vertigo), unspecified laterality  Dizziness  Diagnosis management comments: 59 y/o M presenting with dizziness. Positional in nature. TM's clear. Cardiac work up negative. Head CT without any acute findings. Sxs improved with meclizine. No orthostasis. Will send patient home with meclizine and advised return to ED for worsening sxs.        Amount and/or Complexity of Data Reviewed  Clinical lab tests: ordered and reviewed  Tests in the radiology section of CPT®: ordered and reviewed  Review and summarize past medical records: yes          ED Course:   Initial assessment performed. The patients presenting problems have been discussed, and they are in agreement with the care plan formulated and outlined with them. I have encouraged them to ask questions as they arise throughout their visit. 3:47 PM  Progress Note:  Patient was re-evaluated at bedside. Feeling much better after meclizine, ambulates with steady gait. Procedures       Duy Marks PA-C    Procedures     Duy Marks PA-C        Disposition     Disposition: DC- Adult Discharges: All of the diagnostic tests were reviewed and questions answered. Diagnosis, care plan and treatment options were discussed. The patient understands the instructions and will follow up as directed. The patients results have been reviewed with them. They have been counseled regarding their diagnosis. The patient verbally convey understanding and agreement of the signs, symptoms, diagnosis, treatment and prognosis and additionally agrees to follow up as recommended with their PCP in 24 - 48 hours. They also agree with the care-plan and convey that all of their questions have been answered. I have also put together some discharge instructions for them that include: 1) educational information regarding their diagnosis, 2) how to care for their diagnosis at home, as well a 3) list of reasons why they would want to return to the ED prior to their follow-up appointment, should their condition change. Discharged    DISCHARGE PLAN:  1. Current Discharge Medication List      CONTINUE these medications which have NOT CHANGED    Details   allopurinoL (ZYLOPRIM) 100 mg tablet Take 1 Tab by mouth daily. Qty: 30 Tab, Refills: 6      metoprolol succinate (TOPROL-XL) 25 mg XL tablet Take 1 Tab by mouth daily.   Qty: 30 Tab, Refills: 6      methIMAzole (TAPAZOLE) 5 mg tablet Change to 2 tabs (10 mg) in the morning and 1 tab (5 mg) in the evenings  Qty: 90 Tab, Refills: 6    Associated Diagnoses: Hyperthyroidism      famotidine (PEPCID) 20 mg tablet Take 20 mg by mouth as needed. hydrocortisone (HYTONE) 2.5 % topical cream APPLY TO AFFECTED AREA TWICE A DAY  Refills: 0      betamethasone dipropionate (DIPROLENE) 0.05 % ointment APPLY A THIN FILM TO THE AFFECTED AREAS ON THE ARMS TWICE DAILY X 2 WEEKS. DO NOT APPLY TO THE FACE  Refills: 1           2. Follow-up Information     Follow up With Specialties Details Why Duc Reyna MD Internal Medicine Schedule an appointment as soon as possible for a visit  for follow up from ER visit [id number] [facility] [address] [phone]      [facility] EMERGENCY DEPT Emergency Medicine  As needed, If symptoms worsen [address] [facility] [address] [phone]        3. Return to ED if worse   4. Discharge Medication List as of 3/9/2021  3:58 PM      START taking these medications    Details   meclizine (ANTIVERT) 25 mg tablet Take 1 Tab by mouth three (3) times daily as needed for Dizziness for up to 10 days. , Normal, Disp-30 Tab, R-0         CONTINUE these medications which have NOT CHANGED    Details   allopurinoL (ZYLOPRIM) 100 mg tablet Take 1 Tab by mouth daily. , Normal, Disp-30 Tab,R-6      metoprolol succinate (TOPROL-XL) 25 mg XL tablet Take 1 Tab by mouth daily. , Normal, Disp-30 Tab,R-6      methIMAzole (TAPAZOLE) 5 mg tablet Change to 2 tabs (10 mg) in the morning and 1 tab (5 mg) in the evenings, Normal, Disp-90 Tab,R-6      famotidine (PEPCID) 20 mg tablet Take 20 mg by mouth as needed., Historical Med      hydrocortisone (HYTONE) 2.5 % topical cream APPLY TO AFFECTED AREA TWICE A DAY, Historical Med, R-0      betamethasone dipropionate (DIPROLENE) 0.05 % ointment APPLY A THIN FILM TO THE AFFECTED AREAS ON THE ARMS TWICE DAILY X 2 WEEKS. DO NOT APPLY TO THE FACE, Historical Med, R-1             Diagnosis     Clinical Impression:   1.  BPPV (benign paroxysmal positional vertigo), unspecified laterality    2. Dizziness        Attestations:    Sera Theodore PA-C    Please note that this dictation was completed with [email or web address], the computer voice recognition software. Quite often unanticipated grammatical, syntax, homophones, and other interpretive errors are inadvertently transcribed by the computer software. Please disregard these errors. Please excuse any errors that have escaped final proofreading. Thank you.

## 2021-04-06 ENCOUNTER — OFFICE VISIT (OUTPATIENT)
Dept: ENDOCRINOLOGY | Age: 66
End: 2021-04-06
Payer: MEDICARE

## 2021-04-06 VITALS
HEART RATE: 65 BPM | WEIGHT: 146.4 LBS | OXYGEN SATURATION: 96 % | BODY MASS INDEX: 25.94 KG/M2 | DIASTOLIC BLOOD PRESSURE: 87 MMHG | TEMPERATURE: 97.7 F | HEIGHT: 63 IN | SYSTOLIC BLOOD PRESSURE: 148 MMHG | RESPIRATION RATE: 20 BRPM

## 2021-04-06 DIAGNOSIS — E05.90 HYPERTHYROIDISM: ICD-10-CM

## 2021-04-06 DIAGNOSIS — B19.20 HEPATITIS C VIRUS INFECTION WITHOUT HEPATIC COMA, UNSPECIFIED CHRONICITY: Primary | ICD-10-CM

## 2021-04-06 DIAGNOSIS — E04.1 THYROID NODULE: ICD-10-CM

## 2021-04-06 PROCEDURE — G8427 DOCREV CUR MEDS BY ELIG CLIN: HCPCS | Performed by: INTERNAL MEDICINE

## 2021-04-06 PROCEDURE — 1101F PT FALLS ASSESS-DOCD LE1/YR: CPT | Performed by: INTERNAL MEDICINE

## 2021-04-06 PROCEDURE — 99214 OFFICE O/P EST MOD 30 MIN: CPT | Performed by: INTERNAL MEDICINE

## 2021-04-06 PROCEDURE — G8510 SCR DEP NEG, NO PLAN REQD: HCPCS | Performed by: INTERNAL MEDICINE

## 2021-04-06 PROCEDURE — 3017F COLORECTAL CA SCREEN DOC REV: CPT | Performed by: INTERNAL MEDICINE

## 2021-04-06 PROCEDURE — G8536 NO DOC ELDER MAL SCRN: HCPCS | Performed by: INTERNAL MEDICINE

## 2021-04-06 PROCEDURE — G8419 CALC BMI OUT NRM PARAM NOF/U: HCPCS | Performed by: INTERNAL MEDICINE

## 2021-04-06 RX ORDER — MECLIZINE HYDROCHLORIDE 25 MG/1
25 TABLET ORAL
COMMUNITY
End: 2022-05-06

## 2021-04-06 RX ORDER — METHIMAZOLE 5 MG/1
TABLET ORAL
Qty: 90 TAB | Refills: 1 | Status: SHIPPED | OUTPATIENT
Start: 2021-04-06 | End: 2022-05-06 | Stop reason: SDUPTHER

## 2021-04-06 RX ORDER — LOSARTAN POTASSIUM 100 MG/1
100 TABLET ORAL DAILY
COMMUNITY

## 2021-04-06 RX ORDER — METHIMAZOLE 5 MG/1
TABLET ORAL
Qty: 90 TAB | Refills: 1 | Status: SHIPPED | OUTPATIENT
Start: 2021-04-06 | End: 2021-04-06 | Stop reason: SDUPTHER

## 2021-04-06 NOTE — PROGRESS NOTES
1. Have you been to the ER, urgent care clinic since your last visit? No Hospitalized since your last visit? No    2. Have you seen or consulted any other health care providers outside of the [address] [place] [address] since your last visit? Include any pap smears or colon screening.  No     Wt Readings from Last 3 Encounters:   04/06/21 146 lb 6.4 oz (66.4 kg)   03/09/21 142 lb (64.4 kg)   12/01/20 147 lb 6.4 oz (66.9 kg)     Temp Readings from Last 3 Encounters:   04/06/21 97.7 °F (36.5 °C) (Oral)   03/09/21 98.5 °F (36.9 °C)   12/01/20 98.6 °F (37 °C) (Oral)     BP Readings from Last 3 Encounters:   04/06/21 (!) 148/87   03/09/21 (!) 147/86   12/01/20 (!) 145/79     Pulse Readings from Last 3 Encounters:   04/06/21 65   03/09/21 (!) 58   12/01/20 67

## 2021-04-06 NOTE — PROGRESS NOTES
HISTORY OF PRESENT ILLNESS  Eric Sarkar is a 73 y.o. male. HPI   follow up after last   visit for thyroid nodule and hyper thyroidism from Dec   2020      He got new BP medication he says   He got meclizine for dizziness   He started back working           Dec 2020   Not a great historian  Staying compliant with Tapazole  Gained 10 lbs since jan 2020 April video visit :   Patient was told on the visit of biopsy in January, to reduce the Tapazole to 15 mg a day from 20 mg a day. But patient still continues to be on 20 mg a day of Tapazole  He does shift away from the main topic of discussion  He did express concern for the persisting thyroid nodule in his neck despite the negative thyroid cancer      Old history   Gained 17 lbs   Taking tapazole as prescribed   Here for thyroid nodule biopsy       Referred by pcp   Accompanied by wife who is providing better history   Pt has been found to be losing weight and he had diarrhea , severe   Pt lives separate from his wife and son   When labs are checked, found to be hyperthyroid and got started on tapazole 5 mg tid   Pt has hepatitis c - just came to know about it   Yet to start on treatment           Past Medical History:   Diagnosis Date    Hepatitis C     Tumor, thyroid            review of Systems   Constitutional: Negative. C/O DIARRHEA  Psychiatric/Behavioral: Negative for depression and memory loss. The patient does not have insomnia. Physical Exam   Constitutional: He is oriented to person, place, and time. He appears well-developed and well-nourished. Neck: Normal range of motion. Neck supple. Thyromegaly present. Psychiatric: He has a normal mood and affect. Lab Results   Component Value Date/Time    TSH 3.46 03/09/2021 12:45 PM    T4, Free 0.8 12/01/2020 02:50 PM        ASSESSMENT and PLAN    1. Hyperthyroidism : Graves disease by clinical information. Reviewed [facility].    Could not order  thyroid uptake and scan as he is now on tapazole . Already on tapazole 5 mg tid and pt says he is taking it only twice  A  Day  , even though it was written for tid dosing   Increased  dose to 10 mg bid  In oct 2019    Jan 2020 : he is euthyroid gladly  Decrease  Tapazole dose to 15 mg a day ( TSH  Is  3.92 ), but  Pt  Did not change the dosing   Pt. Is  on  metoprolol     April 2020:   Again reminded him of my change on Tapazole 3 months ago which he did not follow  Decrease tapazole to 10 mg AM  And  5 mg PM    Nov 2020   On tapazole 5 mg dose 2 pills AM  And one pill pm   After  Labs, he was told to take 5 mg one pill AM  And  One pill  PM       April 2021  :    Confusing part here is - nurse called him in dec 2020 after labs, and informed above changed  BUT PATIENT SAYS HE HAS BEEN TAKING 5 MG ONE PILL A DAY ONLY AT LEAST SINCE LAST VISIT     HIS TSH IS GOOD , THEREFORE CONTINUING ON SAME DOSE WHICH IS ONE PILL A DAY      2.   Thyromegaly : reviewed [facility] and found right  lobe nodule to be requiring FNA  usg 10/3/219  from [facility] - there are several ill-defined nodules in right lobe and the largest , measuring 1.41 by 1.08 by 0.97 cm   Solid nodule with no calcifications , wider than tall   jan 2020- performed the FNA -  Benign path   F/u [facility] in jan 2022    Hepatitis C : on treatment       Reviewed results with patient and discussed the labs being ordered today/bnv  Patient voiced understanding of plan of care

## 2021-04-06 NOTE — PATIENT INSTRUCTIONS
SPECIFIC INSTRUCTIONS BELOW       STAY  ON TAPAZOLE 5 MG A DAY       PAY ATTENTION TO THESE GENERAL INSTRUCTIONS     -ANY tests other than blood work, which you opt to do  outside [facility] imaging facilities, you are responsible for prior authorizations if  required   - [address] UP TO DATE ON YOUR AVS- PLEASE IGNORE   - YOUR MED LIST IS NOT UP TO DATE AS SOME CHANGES ARE BEING MADE AFTER THE VISIT - FOLLOW SPECIFIC INSTRUCTIONS  ABOVE     Results     *Normal results will not be notified by a phone call starting January 1 2021   *If you have an upcoming visit, the results will be discussed at the visit   *Please sign up for MY CHART if you want access to your lab and test results  *Abnormal results which require immediate attention will be notified by phone call   *Abnormal results which do not require immediate assistance will be notified in 1-2 weeks       Refills    -    have your pharmacy send us a refill request  Phone calls  -  Allow  24 hrs.  for non-urgent calls to be returned  Prior authorization - It may take 2-4 weeks to process  Forms  -  FMLA, DMV etc., will take up to 2 weeks to process  Cancellations - please notify the office 2 days in advance   Samples  - will only be dispensed at visits     --------------------------------------------------------------------------------------------

## 2021-10-01 DIAGNOSIS — E05.90 HYPERTHYROIDISM: Primary | ICD-10-CM

## 2021-10-01 DIAGNOSIS — E04.1 THYROID NODULE: ICD-10-CM

## 2021-10-08 ENCOUNTER — OFFICE VISIT (OUTPATIENT)
Dept: ENDOCRINOLOGY | Age: 66
End: 2021-10-08
Payer: MEDICARE

## 2021-10-08 VITALS
HEIGHT: 63 IN | BODY MASS INDEX: 25.55 KG/M2 | WEIGHT: 144.2 LBS | TEMPERATURE: 98 F | RESPIRATION RATE: 18 BRPM | SYSTOLIC BLOOD PRESSURE: 117 MMHG | DIASTOLIC BLOOD PRESSURE: 67 MMHG | OXYGEN SATURATION: 97 % | HEART RATE: 63 BPM

## 2021-10-08 DIAGNOSIS — E05.90 HYPERTHYROIDISM: Primary | ICD-10-CM

## 2021-10-08 DIAGNOSIS — E04.1 THYROID NODULE: ICD-10-CM

## 2021-10-08 PROCEDURE — G8419 CALC BMI OUT NRM PARAM NOF/U: HCPCS | Performed by: INTERNAL MEDICINE

## 2021-10-08 PROCEDURE — G8536 NO DOC ELDER MAL SCRN: HCPCS | Performed by: INTERNAL MEDICINE

## 2021-10-08 PROCEDURE — G8427 DOCREV CUR MEDS BY ELIG CLIN: HCPCS | Performed by: INTERNAL MEDICINE

## 2021-10-08 PROCEDURE — 99214 OFFICE O/P EST MOD 30 MIN: CPT | Performed by: INTERNAL MEDICINE

## 2021-10-08 PROCEDURE — G8432 DEP SCR NOT DOC, RNG: HCPCS | Performed by: INTERNAL MEDICINE

## 2021-10-08 PROCEDURE — 3017F COLORECTAL CA SCREEN DOC REV: CPT | Performed by: INTERNAL MEDICINE

## 2021-10-08 PROCEDURE — 1101F PT FALLS ASSESS-DOCD LE1/YR: CPT | Performed by: INTERNAL MEDICINE

## 2021-10-08 RX ORDER — AMLODIPINE BESYLATE 5 MG/1
5 TABLET ORAL DAILY
COMMUNITY
Start: 2021-08-30

## 2021-10-08 NOTE — PROGRESS NOTES
HISTORY OF PRESENT ILLNESS  Ana Batista is a 73 y.o. male. HPI   follow up after last   visit for thyroid nodule and hyper thyroidism from April 2021     His left knee is extremely bothersome , planning to see ortho soon   Compliant with tapazole         April 2021       He got new BP medication he says   He got meclizine for dizziness   He started back working     Dec 2020   Not a great historian  Staying compliant with Tapazole  Gained 10 lbs since jan 2020       Referred by pcp   Accompanied by wife who is providing better history   Pt has been found to be losing weight and he had diarrhea , severe   Pt lives separate from his wife and son   When labs are checked, found to be hyperthyroid and got started on tapazole 5 mg tid   Pt has hepatitis c - just came to know about it   Yet to start on treatment         review of Systems   Constitutional: Negative. C/O DIARRHEA  Psychiatric/Behavioral: Negative for depression and memory loss. The patient does not have insomnia. Physical Exam   Constitutional: He is oriented to person, place, and time. He appears well-developed and well-nourished. Neck: Normal range of motion. Neck supple. Thyromegaly present. Psychiatric: He has a normal mood and affect. Lab Results   Component Value Date/Time    TSH 3.53 10/01/2021 03:25 PM    T4, Free 0.9 10/01/2021 03:25 PM        ASSESSMENT and PLAN    1. Hyperthyroidism : Graves disease by clinical information. Reviewed [facility]. Could not order  thyroid uptake and scan as he is now on tapazole . April 2021  :    Confusing part here is - nurse called him in dec 2020 after labs, and informed above changed  BUT PATIENT SAYS HE HAS BEEN TAKING 5 MG ONE PILL A DAY ONLY AT LEAST SINCE LAST VISIT HIS TSH IS GOOD , THEREFORE CONTINUING ON SAME DOSE WHICH IS ONE PILL A DAY    October 2021 :  Euthyroid  , on  Tapazole 5 mg a day        2.   Thyromegaly : reviewed [facility] and found right  lobe nodule to be requiring FNA  usg 10/3/219  from [facility] - there are several ill-defined nodules in right lobe and the largest , measuring 1.41 by 1.08 by 0.97 cm   Solid nodule with no calcifications , wider than tall     jan 2020- performed the FNA -  Benign path   F/u [facility] in jan 2022    Hepatitis C : on treatment       Reviewed results with patient and discussed the labs being ordered today/bnv  Patient voiced understanding of plan of care

## 2021-10-08 NOTE — PATIENT INSTRUCTIONS
SPECIFIC INSTRUCTIONS BELOW     STAY  ON TAPAZOLE 5 MG A DAY       -------------PAY ATTENTION TO THESE GENERAL INSTRUCTIONS -----------------      - The medications prescribed at this visit will not be available at pharmacy until 6 pm       - YOUR MED LIST IS NOT UP TO DATE AS SOME CHANGES ARE BEING MADE AFTER THE VISIT - FOLLOW SPECIFIC INSTRUCTIONS  ABOVE     -ANY tests other than blood work, which you opt to do  outside the  [facility] facilities, you are responsible for prior authorizations if  required    - [phone] [facility]- PLEASE IGNORE     Results     *Normal results will not be notified by a phone call starting January 1 2021   *If you have an upcoming visit, the results will be discussed at the visit   *Please sign up for MY CHART if you want access to your lab and test results  *Abnormal results which require immediate attention will be notified by phone call   *Abnormal results which do not require immediate assistance will be notified in 1-2 weeks       Refills    -    have your pharmacy send us a refill request . Refills are done max for one year and a visit is a must before refills are extended    Follow up appointments -  highly encourage you to make it when you are checking out. We can accommodate you into the schedule based on your clinical situation, but not for extending refills beyond a year. Labs are important to give refills and is important to get labs before the visit     Phone calls  -  Allow  24 hrs.  for non-urgent calls to be returned  Prior authorization - It may take 2-4 weeks to process  Forms  -  FMLA, DMV etc., will take up to 2 weeks to process  Cancellations - please notify the office 2 days in advance   Samples  - will only be dispensed at visits       If not showing for the appointments and cancelling appointments within 24 hours are kept track of and three  of such situations in  two consecutive years will likely be considered for termination from the practice    -------------------------------------------------------------------------------------------------------------------

## 2021-10-08 NOTE — LETTER
10/10/2021    Patient: Dinora Desai   YOB: 1955   Date of Visit: 10/8/2021     Manuel Ordoñez MD  [address] [address]  [place] [facility] [address]    Dear Manuel Ordoñez MD,      Thank you for referring Mr. Mary Ellen Waddell to [address] [place] [address] for evaluation. My notes for this consultation are attached. If you have questions, please do not hesitate to call me. I look forward to following your patient along with you.       Sincerely,    Jyoti Solares MD

## 2022-04-02 LAB
T4 FREE SERPL-MCNC: 1.01 NG/DL (ref 0.82–1.77)
TSH SERPL DL<=0.005 MIU/L-ACNC: 4.41 UIU/ML (ref 0.45–4.5)

## 2022-05-06 ENCOUNTER — OFFICE VISIT (OUTPATIENT)
Dept: ENDOCRINOLOGY | Age: 67
End: 2022-05-06
Payer: MEDICARE

## 2022-05-06 VITALS
SYSTOLIC BLOOD PRESSURE: 119 MMHG | HEIGHT: 63 IN | OXYGEN SATURATION: 98 % | WEIGHT: 137.6 LBS | HEART RATE: 61 BPM | TEMPERATURE: 98.9 F | DIASTOLIC BLOOD PRESSURE: 67 MMHG | BODY MASS INDEX: 24.38 KG/M2

## 2022-05-06 DIAGNOSIS — E04.1 THYROID NODULE: ICD-10-CM

## 2022-05-06 DIAGNOSIS — E05.90 HYPERTHYROIDISM: Primary | ICD-10-CM

## 2022-05-06 PROCEDURE — G8427 DOCREV CUR MEDS BY ELIG CLIN: HCPCS | Performed by: INTERNAL MEDICINE

## 2022-05-06 PROCEDURE — G8536 NO DOC ELDER MAL SCRN: HCPCS | Performed by: INTERNAL MEDICINE

## 2022-05-06 PROCEDURE — 1101F PT FALLS ASSESS-DOCD LE1/YR: CPT | Performed by: INTERNAL MEDICINE

## 2022-05-06 PROCEDURE — G8420 CALC BMI NORM PARAMETERS: HCPCS | Performed by: INTERNAL MEDICINE

## 2022-05-06 PROCEDURE — G8510 SCR DEP NEG, NO PLAN REQD: HCPCS | Performed by: INTERNAL MEDICINE

## 2022-05-06 PROCEDURE — 99214 OFFICE O/P EST MOD 30 MIN: CPT | Performed by: INTERNAL MEDICINE

## 2022-05-06 PROCEDURE — 3017F COLORECTAL CA SCREEN DOC REV: CPT | Performed by: INTERNAL MEDICINE

## 2022-05-06 RX ORDER — METHIMAZOLE 5 MG/1
TABLET ORAL
Qty: 90 TABLET | Refills: 3 | Status: SHIPPED | OUTPATIENT
Start: 2022-05-06

## 2022-05-06 NOTE — PROGRESS NOTES
HISTORY OF PRESENT ILLNESS  William Betts is a 78 y.o. male. HPI   follow up after last   visit for thyroid nodule and hyper thyroidism from October 2021      no need for left knee surgery    Denies any hyperthyroid symptoms           October 2021     His left knee is extremely bothersome , planning to see ortho soon   Compliant with tapazole       Referred by pcp   Accompanied by wife who is providing better history   Pt has been found to be losing weight and he had diarrhea , severe   Pt lives separate from his wife and son   When labs are checked, found to be hyperthyroid and got started on tapazole 5 mg tid   Pt has hepatitis c - just came to know about it   Yet to start on treatment         review of Systems   Constitutional: Negative. Psychiatric/Behavioral: Negative for depression and memory loss. The patient does not have insomnia. Physical Exam   Constitutional: He is oriented to person, place, and time. He appears well-developed and well-nourished. Neck: Normal range of motion. Neck supple. Thyromegaly present. Psychiatric: He has a normal mood and affect. Lab Results   Component Value Date/Time    TSH 4.410 04/01/2022 12:00 AM    T4, Free 1.01 04/01/2022 12:00 AM        ASSESSMENT and PLAN    1. Hyperthyroidism : Graves disease by clinical information. Reviewed [facility]. Could not order  thyroid uptake and scan as he is now on tapazole . May 2022 :  Lost 7 lbs, euthyroi , will continue on tapazole 5 mg a day   October 2021 :  Euthyroid  , on  Tapazole 5 mg a day        2. Thyromegaly : reviewed [facility] and found right  lobe nodule to be requiring FNA  usg 10/3/219  from [facility] - there are several ill-defined nodules in right lobe and the largest , measuring 1.41 by 1.08 by 0.97 cm   Solid nodule with no calcifications , wider than tall   jan 2020- performed the FNA -  Benign path   F/u usg in jan 2022 - missed it and will do it in  2023     3.  Hepatitis C : into remission 4.  HTN - very well controlled on norvasc and metoprolol    He did get refill  -  Losartan from pcp  .  So , did not  Refill       Reviewed results with patient and discussed the labs being ordered today/bnv  Patient voiced understanding of plan of care

## 2022-05-06 NOTE — PATIENT INSTRUCTIONS
SPECIFIC INSTRUCTIONS BELOW       STAY  ON TAPAZOLE 5 MG A DAY       -------------PAY ATTENTION TO THESE GENERAL INSTRUCTIONS -----------------      - The medications prescribed at this visit will not be available at pharmacy until 6 pm       - YOUR MED LIST IS NOT UP TO DATE AS SOME CHANGES ARE BEING MADE AFTER THE VISIT - FOLLOW SPECIFIC INSTRUCTIONS  ABOVE     -ANY tests other than blood work, which you opt to do  outside the  [facility] facilities, you are responsible for prior authorizations if  required    - [phone] [facility]- PLEASE IGNORE     Results     *Normal results will not be notified by a phone call starting January 1 2021   *If you have an upcoming visit, the results will be discussed at the visit   *Please sign up for MY CHART if you want access to your lab and test results  *Abnormal results which require immediate attention will be notified by phone call   *Abnormal results which do not require immediate assistance will be notified in 1-2 weeks       Refills    -    have your pharmacy send us a refill request . Refills are done max for one year and a visit is a must before refills are extended    Follow up appointments -  highly encourage you to make it when you are checking out. We can accommodate you into the schedule based on your clinical situation, but not for extending refills beyond a year. Labs are important to give refills and is important to get labs before the visit     Phone calls  -  Allow  24 hrs.  for non-urgent calls to be returned  Prior authorization - It may take 2-4 weeks to process  Forms  -  FMLA, DMV etc., will take up to 2 weeks to process  Cancellations - please notify the office 2 days in advance   Samples  - will only be dispensed at visits       If not showing for the appointments and cancelling appointments within 24 hours are kept track of and three  of such situations in  two consecutive years will likely be considered for termination from the practice    -------------------------------------------------------------------------------------------------------------------

## 2022-12-26 ENCOUNTER — APPOINTMENT (OUTPATIENT)
Dept: GENERAL RADIOLOGY | Age: 67
End: 2022-12-26
Attending: EMERGENCY MEDICINE
Payer: MEDICARE

## 2022-12-26 ENCOUNTER — APPOINTMENT (OUTPATIENT)
Dept: CT IMAGING | Age: 67
End: 2022-12-26
Attending: EMERGENCY MEDICINE
Payer: MEDICARE

## 2022-12-26 ENCOUNTER — HOSPITAL ENCOUNTER (EMERGENCY)
Age: 67
Discharge: HOME | End: 2022-12-26
Attending: EMERGENCY MEDICINE
Payer: MEDICARE

## 2022-12-26 VITALS
HEIGHT: 63 IN | WEIGHT: 140 LBS | DIASTOLIC BLOOD PRESSURE: 84 MMHG | TEMPERATURE: 98.5 F | RESPIRATION RATE: 20 BRPM | SYSTOLIC BLOOD PRESSURE: 122 MMHG | HEART RATE: 64 BPM | BODY MASS INDEX: 24.8 KG/M2 | OXYGEN SATURATION: 94 %

## 2022-12-26 DIAGNOSIS — R07.9 CHEST PAIN, UNSPECIFIED TYPE: ICD-10-CM

## 2022-12-26 DIAGNOSIS — U07.1 COVID-19: Primary | ICD-10-CM

## 2022-12-26 LAB
ALBUMIN SERPL-MCNC: 4.2 G/DL (ref 3.5–5)
ALBUMIN/GLOB SERPL: 1.1 {RATIO} (ref 1.1–2.2)
ALP SERPL-CCNC: 70 U/L (ref 45–117)
ALT SERPL-CCNC: 22 U/L (ref 12–78)
ANION GAP SERPL CALC-SCNC: 3 MMOL/L (ref 5–15)
AST SERPL W P-5'-P-CCNC: 17 U/L (ref 15–37)
ATRIAL RATE: 69 BPM
BASOPHILS # BLD: 0 K/UL (ref 0–0.1)
BASOPHILS NFR BLD: 1 % (ref 0–1)
BILIRUB SERPL-MCNC: 0.8 MG/DL (ref 0.2–1)
BUN SERPL-MCNC: 15 MG/DL (ref 6–20)
BUN/CREAT SERPL: 12 (ref 12–20)
CA-I BLD-MCNC: 9.7 MG/DL (ref 8.5–10.1)
CALCULATED P AXIS, ECG09: 28 DEGREES
CALCULATED R AXIS, ECG10: 39 DEGREES
CALCULATED T AXIS, ECG11: 42 DEGREES
CHLORIDE SERPL-SCNC: 99 MMOL/L (ref 97–108)
CO2 SERPL-SCNC: 31 MMOL/L (ref 21–32)
COVID-19 RAPID TEST, COVR: DETECTED
CREAT SERPL-MCNC: 1.28 MG/DL (ref 0.7–1.3)
DIAGNOSIS, 93000: NORMAL
DIFFERENTIAL METHOD BLD: ABNORMAL
EOSINOPHIL # BLD: 0.1 K/UL (ref 0–0.4)
EOSINOPHIL NFR BLD: 3 % (ref 0–7)
ERYTHROCYTE [DISTWIDTH] IN BLOOD BY AUTOMATED COUNT: 13.2 % (ref 11.5–14.5)
FLUAV AG NPH QL IA: NEGATIVE
FLUBV AG NOSE QL IA: NEGATIVE
GLOBULIN SER CALC-MCNC: 3.7 G/DL (ref 2–4)
GLUCOSE SERPL-MCNC: 100 MG/DL (ref 65–100)
HCT VFR BLD AUTO: 44.4 % (ref 36.6–50.3)
HGB BLD-MCNC: 14.8 G/DL (ref 12.1–17)
IMM GRANULOCYTES # BLD AUTO: 0 K/UL (ref 0–0.04)
IMM GRANULOCYTES NFR BLD AUTO: 0 % (ref 0–0.5)
LYMPHOCYTES # BLD: 0.3 K/UL (ref 0.8–3.5)
LYMPHOCYTES NFR BLD: 7 % (ref 12–49)
MCH RBC QN AUTO: 31.7 PG (ref 26–34)
MCHC RBC AUTO-ENTMCNC: 33.3 G/DL (ref 30–36.5)
MCV RBC AUTO: 95.1 FL (ref 80–99)
MONOCYTES # BLD: 0.6 K/UL (ref 0–1)
MONOCYTES NFR BLD: 12 % (ref 5–13)
NEUTS SEG # BLD: 3.7 K/UL (ref 1.8–8)
NEUTS SEG NFR BLD: 77 % (ref 32–75)
NRBC # BLD: 0 K/UL (ref 0–0.01)
NRBC BLD-RTO: 0 PER 100 WBC
P-R INTERVAL, ECG05: 166 MS
PLATELET # BLD AUTO: 253 K/UL (ref 150–400)
PMV BLD AUTO: 10.3 FL (ref 8.9–12.9)
POTASSIUM SERPL-SCNC: 4.6 MMOL/L (ref 3.5–5.1)
PROT SERPL-MCNC: 7.9 G/DL (ref 6.4–8.2)
Q-T INTERVAL, ECG07: 370 MS
QRS DURATION, ECG06: 104 MS
QTC CALCULATION (BEZET), ECG08: 396 MS
RBC # BLD AUTO: 4.67 M/UL (ref 4.1–5.7)
SODIUM SERPL-SCNC: 133 MMOL/L (ref 136–145)
TROPONIN-HIGH SENSITIVITY: 13 NG/L (ref 0–76)
TROPONIN-HIGH SENSITIVITY: 15 NG/L (ref 0–76)
VENTRICULAR RATE, ECG03: 69 BPM
WBC # BLD AUTO: 4.8 K/UL (ref 4.1–11.1)

## 2022-12-26 PROCEDURE — 87804 INFLUENZA ASSAY W/OPTIC: CPT

## 2022-12-26 PROCEDURE — 74011000636 HC RX REV CODE- 636: Performed by: EMERGENCY MEDICINE

## 2022-12-26 PROCEDURE — 71275 CT ANGIOGRAPHY CHEST: CPT

## 2022-12-26 PROCEDURE — 71045 X-RAY EXAM CHEST 1 VIEW: CPT

## 2022-12-26 PROCEDURE — 93005 ELECTROCARDIOGRAM TRACING: CPT

## 2022-12-26 PROCEDURE — 80053 COMPREHEN METABOLIC PANEL: CPT

## 2022-12-26 PROCEDURE — 74011250637 HC RX REV CODE- 250/637: Performed by: EMERGENCY MEDICINE

## 2022-12-26 PROCEDURE — 84484 ASSAY OF TROPONIN QUANT: CPT

## 2022-12-26 PROCEDURE — 99285 EMERGENCY DEPT VISIT HI MDM: CPT

## 2022-12-26 PROCEDURE — 36415 COLL VENOUS BLD VENIPUNCTURE: CPT

## 2022-12-26 PROCEDURE — 87635 SARS-COV-2 COVID-19 AMP PRB: CPT

## 2022-12-26 PROCEDURE — 85025 COMPLETE CBC W/AUTO DIFF WBC: CPT

## 2022-12-26 RX ORDER — ASPIRIN 325 MG
325 TABLET ORAL
Status: COMPLETED | OUTPATIENT
Start: 2022-12-26 | End: 2022-12-26

## 2022-12-26 RX ORDER — NITROGLYCERIN 0.4 MG/1
0.4 TABLET SUBLINGUAL
Status: COMPLETED | OUTPATIENT
Start: 2022-12-26 | End: 2022-12-26

## 2022-12-26 RX ADMIN — NITROGLYCERIN 0.4 MG: 0.4 TABLET, ORALLY DISINTEGRATING SUBLINGUAL at 13:13

## 2022-12-26 RX ADMIN — ASPIRIN 325 MG: 325 TABLET ORAL at 13:13

## 2022-12-26 RX ADMIN — IOPAMIDOL 100 ML: 755 INJECTION, SOLUTION INTRAVENOUS at 14:03

## 2022-12-26 NOTE — ED PROVIDER NOTES
EMERGENCY DEPARTMENT HISTORY AND PHYSICAL EXAM      Date: 12/26/2022  Patient Name: Golden Timmons    History of Presenting Illness     Chief Complaint   Patient presents with    Chest Pain       History Provided By: Patient    HPI: Golden Timmons, 78 y.o. male with history of hepatitis C who presents with chest pain. He states that his pain started about 4 days ago. Pain is in the left side of his chest, 5/10, constant, and pressure-like. He has taken Tylenol for pain which has not helped. He does have radiation of his pain into both arms. States that he did have a stress test and cardiac catheterization about 2 or 3 years ago. States that these were normal.  Denies any leg swelling, hemoptysis, history of PE or DVT. There are no other complaints, changes, or physical findings at this time. PCP: Lynda Reeves MD    Current Outpatient Medications   Medication Sig Dispense Refill    methIMAzole (TAPAZOLE) 5 mg tablet ONE PILL A DAY 90 Tablet 3    amLODIPine (NORVASC) 5 mg tablet Take 5 mg by mouth daily. losartan (COZAAR) 100 mg tablet Take 100 mg by mouth daily. allopurinoL (ZYLOPRIM) 100 mg tablet Take 1 Tab by mouth daily. 30 Tab 6    metoprolol succinate (TOPROL-XL) 25 mg XL tablet Take 1 Tab by mouth daily. 30 Tab 6    hydrocortisone (HYTONE) 2.5 % topical cream APPLY TO AFFECTED AREA TWICE A DAY  0    betamethasone dipropionate (DIPROLENE) 0.05 % ointment APPLY A THIN FILM TO THE AFFECTED AREAS ON THE ARMS TWICE DAILY X 2 WEEKS. DO NOT APPLY TO THE FACE  1       Past History   Past Medical History:  Past Medical History:   Diagnosis Date    Hepatitis C     Tumor, thyroid         Past Surgical History:  No past surgical history on file. Family History:  History reviewed. No pertinent family history.     Social History:  Social History     Tobacco Use    Smoking status: Former    Smokeless tobacco: Never    Tobacco comments:     quit 12 years ago   Substance Use Topics Alcohol use: Not Currently    Drug use: Yes     Types: Marijuana     Comment: pt states, not often       Allergies:  No Known Allergies     Review of Systems   Review of Systems   Constitutional:  Negative for fever. HENT:  Negative for congestion. Eyes:  Negative for visual disturbance. Respiratory:  Negative for shortness of breath. Cardiovascular:  Positive for chest pain. Gastrointestinal:  Negative for abdominal pain. Genitourinary:  Negative for dysuria. Musculoskeletal:  Negative for arthralgias. Skin:  Negative for rash. Neurological:  Negative for headaches. Physical Exam   Constitutional: No acute distress. Well-nourished. Skin: No rash. ENT: No rhinorrhea. No cough. Head is normocephalic and atraumatic. Eye: No proptosis or conjunctival injections. Respiratory: No apparent respiratory distress. Lung sounds are clear. Gastrointestinal: Nondistended. Musculoskeletal: No obvious bony deformities. Cardiac: Regular rate and rhythm. 2+ radial pulses. No murmurs.     Lab and Diagnostic Study Results   Labs -     Recent Results (from the past 12 hour(s))   EKG, 12 LEAD, INITIAL    Collection Time: 12/26/22 11:02 AM   Result Value Ref Range    Ventricular Rate 69 BPM    Atrial Rate 69 BPM    P-R Interval 166 ms    QRS Duration 104 ms    Q-T Interval 370 ms    QTC Calculation (Bezet) 396 ms    Calculated P Axis 28 degrees    Calculated R Axis 39 degrees    Calculated T Axis 42 degrees    Diagnosis       Normal sinus rhythm  Incomplete right bundle branch block  Minimal voltage criteria for LVH, may be normal variant  Possible Anterior infarct , age undetermined  Abnormal ECG  When compared with ECG of 09-MAR-2021 12:52,  No significant change was found  Confirmed by BRODIE MCKINNEY ([id number]) on 12/26/2022 1:55:32 PM     CBC WITH AUTOMATED DIFF    Collection Time: 12/26/22 11:27 AM   Result Value Ref Range    WBC 4.8 4.1 - 11.1 K/uL    RBC 4.67 4.10 - 5.70 M/uL    HGB 14.8 12.1 - 17.0 g/dL    HCT 44.4 36.6 - 50.3 %    MCV 95.1 80.0 - 99.0 FL    MCH 31.7 26.0 - 34.0 PG    MCHC 33.3 30.0 - 36.5 g/dL    RDW 13.2 11.5 - 14.5 %    PLATELET 429 568 - 290 K/uL    MPV 10.3 8.9 - 12.9 FL    NRBC 0.0 0.0  WBC    ABSOLUTE NRBC 0.00 0.00 - 0.01 K/uL    NEUTROPHILS 77 (H) 32 - 75 %    LYMPHOCYTES 7 (L) 12 - 49 %    MONOCYTES 12 5 - 13 %    EOSINOPHILS 3 0 - 7 %    BASOPHILS 1 0 - 1 %    IMMATURE GRANULOCYTES 0 0 - 0.5 %    ABS. NEUTROPHILS 3.7 1.8 - 8.0 K/UL    ABS. LYMPHOCYTES 0.3 (L) 0.8 - 3.5 K/UL    ABS. MONOCYTES 0.6 0.0 - 1.0 K/UL    ABS. EOSINOPHILS 0.1 0.0 - 0.4 K/UL    ABS. BASOPHILS 0.0 0.0 - 0.1 K/UL    ABS. IMM. GRANS. 0.0 0.00 - 0.04 K/UL    DF AUTOMATED     METABOLIC PANEL, COMPREHENSIVE    Collection Time: 12/26/22 11:27 AM   Result Value Ref Range    Sodium 133 (L) 136 - 145 mmol/L    Potassium 4.6 3.5 - 5.1 mmol/L    Chloride 99 97 - 108 mmol/L    CO2 31 21 - 32 mmol/L    Anion gap 3 (L) 5 - 15 mmol/L    Glucose 100 65 - 100 mg/dL    BUN 15 6 - 20 mg/dL    Creatinine 1.28 0.70 - 1.30 mg/dL    BUN/Creatinine ratio 12 12 - 20      eGFR >60 >60 ml/min/1.73m2    Calcium 9.7 8.5 - 10.1 mg/dL    Bilirubin, total 0.8 0.2 - 1.0 mg/dL    AST (SGOT) 17 15 - 37 U/L    ALT (SGPT) 22 12 - 78 U/L    Alk.  phosphatase 70 45 - 117 U/L    Protein, total 7.9 6.4 - 8.2 g/dL    Albumin 4.2 3.5 - 5.0 g/dL    Globulin 3.7 2.0 - 4.0 g/dL    A-G Ratio 1.1 1.1 - 2.2     TROPONIN-HIGH SENSITIVITY    Collection Time: 12/26/22 11:27 AM   Result Value Ref Range    Troponin-High Sensitivity 13 0 - 76 ng/L   COVID-19 RAPID TEST    Collection Time: 12/26/22  1:08 PM   Result Value Ref Range    COVID-19 rapid test DETECTED (A) Not Detected     INFLUENZA A & B AG (RAPID TEST)    Collection Time: 12/26/22  1:08 PM   Result Value Ref Range    Influenza A Antigen Negative Negative      Influenza B Antigen Negative Negative     TROPONIN-HIGH SENSITIVITY    Collection Time: 12/26/22  4:07 PM   Result Value Ref Range Troponin-High Sensitivity 15 0 - 76 ng/L       Radiologic Studies -   [unfilled]  CT Results  (Last 48 hours)                 12/26/22 1402  CTA CHEST W OR W WO CONT Final result    Impression:          1. Enlarged pulmonary arteries compatible with pulmonary hypertension. No   evidence for acute pulmonary embolism. 2. Minimal aneurysmal dilatation of the ascending aorta without evidence for   dissection. 3. Indeterminate 1 cm left adrenal nodule. 4. Small low-density renal lesions likely cysts. Narrative:  INDICATION:  chest pain, rule out PE        EXAM:  CTA Chest with contrast for Pulmonary Embolus       COMPARISON:  None       TECHNIQUE:  Following the uneventful intravenous administration of Iodinated   contrast, thin helical axial images were obtained through the chest. 3D image   postprocessing was performed. Coronal and sagittal reformatted images were   obtained. CT dose reduction was achieved through use of a standardized protocol   tailored for this examination and automatic exposure control for dose   modulation. FINDINGS:       Enlarged pulmonary arteries compatible with pulmonary hypertension. No evidence   for acute pulmonary embolism. .        Mild aneurysmal dilatation of the ascending aorta measuring 4 cm. No evidence   for thoracic aortic dissection. No pleural or pericardial effusion. No thoracic lymphadenopathy. Indeterminate 1 cm left adrenal nodule. Small bilateral low-density renal   lesions likely cysts       Mild to moderate degenerative changes in the spine                 CXR Results  (Last 48 hours)                 12/26/22 1120  XR CHEST PORT Final result    Impression:  Possible subtle diffuse interstitial/airspace opacities. CT chest pending, see   separate result.        Narrative:  INDICATION: chest pain       EXAM:  AP CHEST RADIOGRAPH       COMPARISON: March 9, 2021       FINDINGS:       AP portable view of the chest demonstrates a normal cardiomediastinal   silhouette. Possible subtle interstitial and airspace opacities in both lungs. No pneumothorax or significant pleural effusion. The osseous structures are   unremarkable. Medical Decision Making and ED Course   - I am the first and primary provider for this patient AND AM THE PRIMARY PROVIDER OF RECORD. I reviewed the vital signs, available nursing notes, past medical history, past surgical history, family history and social history. - Initial assessment performed. The patients presenting problems have been discussed, and the staff are in agreement with the care plan formulated and outlined with them. I have encouraged them to ask questions as they arise throughout their visit. Vital Signs-Reviewed the patient's vital signs. Patient Vitals for the past 12 hrs:   Temp Pulse Resp BP SpO2   12/26/22 1325 -- 66 22 118/68 92 %   12/26/22 1313 -- 67 -- 134/72 --   12/26/22 1240 -- 62 15 127/73 --   12/26/22 1140 -- 64 21 (!) 134/122 98 %   12/26/22 1125 -- -- -- -- 100 %   12/26/22 1107 98.5 °F (36.9 °C) 65 18 (!) 144/86 100 %     EKG interpretation: (Preliminary): EKG Interpreted by ED physician. Obtained on 12/26/2022 at 1102. Read at 1150. Normal sinus rhythm at rate of 69 bpm.  Normal VT interval, QRS duration, QTc interval.  No ST segment abnormalities. Normal axis. MDM  Differential diagnosis: ACS, atypical chest pain, costochondritis, pleurisy, COVID, PE. Broad work-up completed. He has negative troponin x2. He is positive for COVID. CTA completed and shows no signs of pulmonary embolism or infiltrate. I will treat symptomatically. Patient was given aspirin initially due to his chest pain. I do think that maybe he has pleurisy related to the [place]. He does have a mild cough on reassessment. No fever. He is well-appearing. I have low suspicion for ACS. I feel it is safe to discharge home.     I did consider admission and discussed this closely with the patient. I also discussed it with Dr. Lynette Saldivar, who is covering for patient's PCP, Dr. Kayden Ball. We both considered admission but also feel that he is safe to go home. I will have him follow-up with his PCP. Heart Score:   ED Heart Score  History: Moderately Suspicious  ECG: Normal  Age: Greater than or equal to 65 years  Risk Factors: 1 or 2 risk factors  Troponin: Less than or equal to normal limit  HEART Score Total : 4      Disposition     Disposition: Discharged    DISCHARGE PLAN:  1. Current Discharge Medication List        CONTINUE these medications which have NOT CHANGED    Details   methIMAzole (TAPAZOLE) 5 mg tablet ONE PILL A DAY  Qty: 90 Tablet, Refills: 3    Associated Diagnoses: Hyperthyroidism      amLODIPine (NORVASC) 5 mg tablet Take 5 mg by mouth daily. losartan (COZAAR) 100 mg tablet Take 100 mg by mouth daily. allopurinoL (ZYLOPRIM) 100 mg tablet Take 1 Tab by mouth daily. Qty: 30 Tab, Refills: 6      metoprolol succinate (TOPROL-XL) 25 mg XL tablet Take 1 Tab by mouth daily. Qty: 30 Tab, Refills: 6      hydrocortisone (HYTONE) 2.5 % topical cream APPLY TO AFFECTED AREA TWICE A DAY  Refills: 0      betamethasone dipropionate (DIPROLENE) 0.05 % ointment APPLY A THIN FILM TO THE AFFECTED AREAS ON THE ARMS TWICE DAILY X 2 WEEKS. DO NOT APPLY TO THE FACE  Refills: 1           2. Follow-up Information       Follow up With Specialties Details Why [facility] EMERGENCY DEPT Emergency Medicine Go today As soon as possible if symptoms worsen [address] [address]  [phone]    Primary care doctor  Schedule an appointment as soon as possible for a visit in 3 days            3. Return to ED if worse   4. Current Discharge Medication List           Diagnosis/Clinical Impression     Clinical Impression:   1. COVID-19    2.  Chest pain, unspecified type         Attestations:  Sara Sanz, DO    Please note that this dictation was completed with Dragon, the computer voice recognition software. Quite often unanticipated grammatical, syntax, homophones, and other interpretive errors are inadvertently transcribed by the computer software. Please disregard these errors. Please excuse any errors that have escaped final proofreading. Thank you.

## 2022-12-26 NOTE — Clinical Note
[facility] EMERGENCY DEPT  [address] [id number] [phone]    Work/School Note    Date: 12/26/2022     To Whom It May concern:    Barber Soto was evaluated by the following provider(s):  Attending Provider: Keisha Quinn virus is suspected. Per the CDC guidelines we recommend home isolation until the following conditions are all met:    1. At least five days have passed since symptoms first appeared and/or had a close exposure,   2. After home isolation for five days, wearing a mask around others for the next five days,  3. At least 24 have passed since last fever without the use of fever-reducing medications and  4.  Symptoms (eg cough, shortness of breath) have improved      Sincerely,          Tao Figueredo,

## 2023-04-21 DIAGNOSIS — E05.90 HYPERTHYROIDISM: Primary | ICD-10-CM

## 2023-04-21 DIAGNOSIS — E04.1 THYROID NODULE: ICD-10-CM

## 2023-04-22 DIAGNOSIS — E05.90 HYPERTHYROIDISM: Primary | ICD-10-CM

## 2023-04-22 DIAGNOSIS — E04.1 THYROID NODULE: ICD-10-CM

## 2023-05-08 ENCOUNTER — OFFICE VISIT (OUTPATIENT)
Age: 68
End: 2023-05-08
Payer: COMMERCIAL

## 2023-05-08 VITALS
TEMPERATURE: 96.8 F | HEART RATE: 63 BPM | OXYGEN SATURATION: 98 % | BODY MASS INDEX: 24.31 KG/M2 | SYSTOLIC BLOOD PRESSURE: 106 MMHG | WEIGHT: 137.2 LBS | DIASTOLIC BLOOD PRESSURE: 66 MMHG | HEIGHT: 63 IN

## 2023-05-08 DIAGNOSIS — E05.20 THYROTOXICOSIS WITH TOXIC MULTINODULAR GOITER AND WITHOUT THYROID STORM: Primary | ICD-10-CM

## 2023-05-08 DIAGNOSIS — E27.8 ADRENAL NODULE (HCC): ICD-10-CM

## 2023-05-08 PROCEDURE — 1123F ACP DISCUSS/DSCN MKR DOCD: CPT | Performed by: INTERNAL MEDICINE

## 2023-05-08 PROCEDURE — 99215 OFFICE O/P EST HI 40 MIN: CPT | Performed by: INTERNAL MEDICINE

## 2023-05-08 RX ORDER — DEXAMETHASONE 1 MG
TABLET ORAL
Qty: 1 TABLET | Refills: 0 | Status: SHIPPED | OUTPATIENT
Start: 2023-05-08

## 2023-05-08 RX ORDER — METHIMAZOLE 5 MG/1
5 TABLET ORAL
COMMUNITY
Start: 2023-02-23 | End: 2023-05-08 | Stop reason: SDUPTHER

## 2023-05-08 RX ORDER — AMLODIPINE BESYLATE 5 MG/1
5 TABLET ORAL DAILY
COMMUNITY
Start: 2023-04-14

## 2023-05-08 RX ORDER — LOSARTAN POTASSIUM 100 MG/1
100 TABLET ORAL DAILY
COMMUNITY
End: 2023-05-08 | Stop reason: DRUGHIGH

## 2023-05-08 RX ORDER — METHIMAZOLE 5 MG/1
TABLET ORAL
Qty: 90 TABLET | Refills: 1 | Status: SHIPPED | OUTPATIENT
Start: 2023-05-08

## 2023-05-08 RX ORDER — METOPROLOL SUCCINATE 25 MG/1
25 TABLET, EXTENDED RELEASE ORAL DAILY
COMMUNITY
Start: 2023-05-05

## 2023-05-08 RX ORDER — ALLOPURINOL 100 MG/1
100 TABLET ORAL DAILY
COMMUNITY

## 2023-05-08 RX ORDER — LOSARTAN POTASSIUM 50 MG/1
TABLET ORAL
Qty: 90 TABLET | Refills: 0 | Status: SHIPPED | OUTPATIENT
Start: 2023-05-08

## 2023-05-08 NOTE — PATIENT INSTRUCTIONS
SPECIFIC INSTRUCTIONS BELOW       Tapazole  5 mg a day       I ordered radiology tests CT adrenal/ abdomen  and   thyroid ultrasound       First do labs for baseline - within 2 weeks , fasting     Second -  You pick a day of your choice and take the dexamethasone  tablets  1  mg dose one pill   At 11 pm    Next day before 9 AM, go  to get another lab draw done . ( lab print out  says  - second set )          -------------Nisa 1959 -----------------      - The medications prescribed at this visit will not be available at pharmacy until 6 pm       - YOUR MED LIST IS NOT UP TO DATE AS SOME CHANGES ARE BEING MADE AFTER THE VISIT - FOLLOW SPECIFIC INSTRUCTIONS  ABOVE     -ANY tests other than blood work, which you opt to do  outside the  [facility] imaging facilities, you are responsible for prior authorizations if  required    - [phone] [facility]- PLEASE IGNORE     Results     *Normal results will not be notified by a phone call starting January 1 2021   *If you have an upcoming visit, the results will be discussed at the visit   *Please sign up for MY CHART if you want access to your lab and test results  *Abnormal results which require immediate attention will be notified by phone call   *Abnormal results which do not require immediate assistance will be notified in 1-2 weeks       Refills    -    have your pharmacy send us a refill request . Refills are done max for one year and a visit is a must before refills are extended    Follow up appointments -  highly encourage you to make it when you are checking out. We can accommodate you into the schedule based on your clinical situation, but not for extending refills beyond a year. Labs are important to give refills and is important to get labs before the visit     Phone calls  -  Allow  24 hrs.  for non-urgent calls to be returned  Prior authorization - It may take 2-4 weeks to

## 2023-05-08 NOTE — PROGRESS NOTES
[facility]              Eleuterio Hunter MD [address]     HISTORY OF PRESENT ILLNESS  Teresa Moreno is a 80 y.o. male. HPI   follow up after last   visit for thyroid nodule and hyper thyroidism from May 2022     No new symptoms   He drives taxi     He had [place] in  Dec 2022  -  showed incidental  adrenal nodule       May 2022      no need for left knee surgery    Denies any hyperthyroid symptoms       October 2021     His left knee is extremely bothersome , planning to see ortho soon   Compliant with tapazole       Referred by pcp   Accompanied by wife who is providing better history   Pt has been found to be losing weight and he had diarrhea , severe   Pt lives separate from his wife and son   When labs are checked, found to be hyperthyroid and got started on tapazole 5 mg tid   Pt has hepatitis c - just came to know about it   Yet to start on treatment         review of Systems   Constitutional: Negative. Psychiatric/Behavioral: Negative for depression and memory loss. The patient does not have insomnia. Physical Exam   Constitutional: He is oriented to person, place, and time. He appears well-developed and well-nourished. Neck: Normal range of motion. Neck supple. Thyromegaly present. Psychiatric: He has a normal mood and affect. No labs this year    ASSESSMENT and PLAN    1. Hyperthyroidism : Graves disease by clinical information. Reviewed USG. Could not order  thyroid uptake and scan as he is now on tapazole . May 2023 :   stay on tapazole   5 mg a day   May 2022 :  Lost 7 lbs, euthyroi , will continue on tapazole 5 mg a day   October 2021 :  Euthyroid  , on  Tapazole 5 mg a day        2.   Thyromegaly : reviewed usg and found right  lobe nodule to be requiring FNA  usg 10/3/219  from [facility] - there are several ill-defined nodules in right lobe and the largest , measuring 1.41 by 1.08 by 0.97 cm   Solid nodule with no calcifications , wider than

## 2023-05-22 DIAGNOSIS — E05.80 OTHER THYROTOXICOSIS WITHOUT THYROTOXIC CRISIS OR STORM: ICD-10-CM

## 2023-05-22 DIAGNOSIS — E27.8 ADRENAL NODULE (HCC): ICD-10-CM

## 2023-05-22 DIAGNOSIS — E05.20 THYROTOXICOSIS WITH TOXIC MULTINODULAR GOITER AND WITHOUT THYROID STORM: Primary | ICD-10-CM

## 2023-05-22 DIAGNOSIS — E04.1 NONTOXIC SINGLE THYROID NODULE: ICD-10-CM

## 2023-05-22 RX ORDER — METHIMAZOLE 5 MG/1
TABLET ORAL
Qty: 90 TABLET | Refills: 3 | Status: SHIPPED | OUTPATIENT
Start: 2023-05-22

## 2023-05-24 RX ORDER — ALLOPURINOL 100 MG/1
100 TABLET ORAL DAILY
COMMUNITY
Start: 2020-04-22

## 2023-05-24 RX ORDER — AMLODIPINE BESYLATE 5 MG/1
5 TABLET ORAL DAILY
COMMUNITY
Start: 2021-08-30

## 2023-05-24 RX ORDER — BETAMETHASONE DIPROPIONATE 0.05 %
OINTMENT (GRAM) TOPICAL
COMMUNITY
Start: 2019-08-19

## 2023-05-24 RX ORDER — METHIMAZOLE 5 MG/1
TABLET ORAL
COMMUNITY
Start: 2022-05-06

## 2023-05-24 RX ORDER — METOPROLOL SUCCINATE 25 MG/1
25 TABLET, EXTENDED RELEASE ORAL DAILY
COMMUNITY
Start: 2020-04-22

## 2023-05-31 ENCOUNTER — HOSPITAL ENCOUNTER (OUTPATIENT)
Facility: HOSPITAL | Age: 68
Discharge: HOME | End: 2023-06-03
Payer: MEDICARE

## 2023-05-31 DIAGNOSIS — E05.20 THYROTOXICOSIS WITH TOXIC MULTINODULAR GOITER AND WITHOUT THYROID STORM: ICD-10-CM

## 2023-05-31 DIAGNOSIS — E27.8 ADRENAL NODULE (HCC): ICD-10-CM

## 2023-05-31 LAB — CREAT BLD-MCNC: 1.3 MG/DL (ref 0.6–1.3)

## 2023-05-31 PROCEDURE — 6360000004 HC RX CONTRAST MEDICATION: Performed by: INTERNAL MEDICINE

## 2023-05-31 PROCEDURE — 82565 ASSAY OF CREATININE: CPT

## 2023-05-31 PROCEDURE — 74170 CT ABD WO CNTRST FLWD CNTRST: CPT

## 2023-05-31 RX ORDER — DEXAMETHASONE 1 MG
TABLET ORAL
Qty: 1 TABLET | Refills: 0 | Status: SHIPPED | OUTPATIENT
Start: 2023-05-31

## 2023-05-31 RX ADMIN — IOPAMIDOL 100 ML: 755 INJECTION, SOLUTION INTRAVENOUS at 10:21

## 2023-06-01 LAB — T4 FREE SERPL-MCNC: 1.13 NG/DL (ref 0.82–1.77)

## 2023-06-07 ENCOUNTER — CLINICAL DOCUMENTATION (OUTPATIENT)
Age: 68
End: 2023-06-07

## 2023-06-07 DIAGNOSIS — E04.1 NONTOXIC SINGLE THYROID NODULE: ICD-10-CM

## 2023-06-07 DIAGNOSIS — E27.8 ADRENAL NODULE (HCC): ICD-10-CM

## 2023-06-07 DIAGNOSIS — E05.20 THYROTOXICOSIS WITH TOXIC MULTINODULAR GOITER AND WITHOUT THYROID STORM: Primary | ICD-10-CM

## 2023-06-07 DIAGNOSIS — E05.80 OTHER THYROTOXICOSIS WITHOUT THYROTOXIC CRISIS OR STORM: ICD-10-CM

## 2023-06-07 NOTE — PROGRESS NOTES
Reviewed all the lab orders and results     - as of today I noticed -  only free t4 was drawn  without TSH   after my last visit with him     -  he has 1 mg decadron pill in hand  and Achilles Salt called him today to prepone his appt to before 9 am to get fasting  cortisol and dexamethasone  drawn   ( he was not very comfortable coming in very early but agreed to it )     - he is to get baseline thyroid  and adrenal labs done still     - also, noticed that he did not get the  thyroid usg done  along with  CT abdomen           Isabel Zavala MD

## 2023-06-07 NOTE — PROGRESS NOTES
Order placed for pt per verbal order with read back from  06/07/23      Fixed orders so everything will be collected correctly .

## 2023-06-25 ENCOUNTER — HOSPITAL ENCOUNTER (EMERGENCY)
Facility: HOSPITAL | Age: 68
Discharge: HOME | End: 2023-06-25
Payer: MEDICARE

## 2023-06-25 VITALS
RESPIRATION RATE: 18 BRPM | OXYGEN SATURATION: 98 % | HEART RATE: 72 BPM | DIASTOLIC BLOOD PRESSURE: 80 MMHG | HEIGHT: 63 IN | BODY MASS INDEX: 23.74 KG/M2 | WEIGHT: 134 LBS | TEMPERATURE: 98 F | SYSTOLIC BLOOD PRESSURE: 142 MMHG

## 2023-06-25 DIAGNOSIS — H10.9 CONJUNCTIVITIS OF BOTH EYES, UNSPECIFIED CONJUNCTIVITIS TYPE: Primary | ICD-10-CM

## 2023-06-25 PROCEDURE — 6370000000 HC RX 637 (ALT 250 FOR IP): Performed by: NURSE PRACTITIONER

## 2023-06-25 PROCEDURE — 99283 EMERGENCY DEPT VISIT LOW MDM: CPT

## 2023-06-25 RX ORDER — POLYMYXIN B SULFATE AND TRIMETHOPRIM 1; 10000 MG/ML; [USP'U]/ML
1 SOLUTION OPHTHALMIC EVERY 6 HOURS
Qty: 10 ML | Refills: 0 | Status: SHIPPED | OUTPATIENT
Start: 2023-06-25 | End: 2023-06-25 | Stop reason: SDUPTHER

## 2023-06-25 RX ORDER — ERYTHROMYCIN 5 MG/G
OINTMENT OPHTHALMIC ONCE
Status: COMPLETED | OUTPATIENT
Start: 2023-06-25 | End: 2023-06-25

## 2023-06-25 RX ORDER — POLYMYXIN B SULFATE AND TRIMETHOPRIM 1; 10000 MG/ML; [USP'U]/ML
1 SOLUTION OPHTHALMIC EVERY 6 HOURS
Qty: 10 ML | Refills: 0 | Status: SHIPPED | OUTPATIENT
Start: 2023-06-25 | End: 2023-06-30

## 2023-06-25 RX ADMIN — ERYTHROMYCIN: 5 OINTMENT OPHTHALMIC at 19:57

## 2023-06-25 ASSESSMENT — LIFESTYLE VARIABLES
HOW OFTEN DO YOU HAVE A DRINK CONTAINING ALCOHOL: NEVER
HOW MANY STANDARD DRINKS CONTAINING ALCOHOL DO YOU HAVE ON A TYPICAL DAY: PATIENT DOES NOT DRINK

## 2023-06-26 ASSESSMENT — VISUAL ACUITY: OU: 1

## 2023-06-27 ENCOUNTER — HOSPITAL ENCOUNTER (EMERGENCY)
Facility: HOSPITAL | Age: 68
Discharge: HOME | End: 2023-06-27
Payer: MEDICARE

## 2023-06-27 VITALS
TEMPERATURE: 98.6 F | OXYGEN SATURATION: 98 % | SYSTOLIC BLOOD PRESSURE: 144 MMHG | HEIGHT: 63 IN | BODY MASS INDEX: 25.34 KG/M2 | WEIGHT: 143 LBS | HEART RATE: 60 BPM | RESPIRATION RATE: 20 BRPM | DIASTOLIC BLOOD PRESSURE: 69 MMHG

## 2023-06-27 DIAGNOSIS — H10.33 ACUTE BACTERIAL CONJUNCTIVITIS OF BOTH EYES: Primary | ICD-10-CM

## 2023-06-27 PROCEDURE — 99283 EMERGENCY DEPT VISIT LOW MDM: CPT

## 2023-06-27 RX ORDER — CETIRIZINE HYDROCHLORIDE 10 MG/1
10 TABLET ORAL DAILY
Qty: 30 TABLET | Refills: 0 | Status: SHIPPED | OUTPATIENT
Start: 2023-06-27 | End: 2023-07-27

## 2023-06-27 RX ORDER — ERYTHROMYCIN 5 MG/G
OINTMENT OPHTHALMIC
Qty: 1 G | Refills: 0 | Status: SHIPPED | OUTPATIENT
Start: 2023-06-27

## 2023-06-27 ASSESSMENT — PAIN SCALES - GENERAL: PAINLEVEL_OUTOF10: 1

## 2023-06-27 ASSESSMENT — PAIN DESCRIPTION - LOCATION: LOCATION: EYE

## 2023-06-27 ASSESSMENT — VISUAL ACUITY: OU: 1

## 2023-06-27 ASSESSMENT — LIFESTYLE VARIABLES
HOW OFTEN DO YOU HAVE A DRINK CONTAINING ALCOHOL: NEVER
HOW MANY STANDARD DRINKS CONTAINING ALCOHOL DO YOU HAVE ON A TYPICAL DAY: PATIENT DOES NOT DRINK
HOW OFTEN DO YOU HAVE A DRINK CONTAINING ALCOHOL: NEVER

## 2023-07-12 ENCOUNTER — TELEPHONE (OUTPATIENT)
Age: 68
End: 2023-07-12

## 2023-07-12 DIAGNOSIS — E27.8 ADRENAL NODULE (HCC): Primary | ICD-10-CM

## 2023-07-12 NOTE — TELEPHONE ENCOUNTER
----- Message from Bassem High MD sent at 7/7/2023 11:28 PM EDT -----      Pt needs a visit with me in  nov - dec 2023  ,     He has an adrenal nodule  for which I am evaluating him and some labs are positive   And some are not drawn     Some confusion occurred on lab draws  and he already was not too happy to get labs done  per  notes  etc., ( Only free t4 was done )  we never order only this lab -     -  he needs ACTH,  cortisol, DHEA-S  , plasma mets and comp panel and lionel -renin ratios   for evaluation of adrenal nodule   ( dhea-s was not ordered before  and that needs to be done )       - needs salivary swab checks done as well    If he gives no resistance , he can do these labs and see me   If he needs understanding , I am happy to do that in nov visit and then he gets these labs done .        Bassem High MD

## 2023-07-12 NOTE — TELEPHONE ENCOUNTER
Labs ordered , salivary cortisol at front for  pt aware . Pt sent to front to be scheduled for labs and appt .

## 2023-07-24 ENCOUNTER — HOSPITAL ENCOUNTER (EMERGENCY)
Facility: HOSPITAL | Age: 68
Discharge: HOME | End: 2023-07-24
Payer: MEDICARE

## 2023-07-24 VITALS
TEMPERATURE: 98 F | RESPIRATION RATE: 18 BRPM | SYSTOLIC BLOOD PRESSURE: 127 MMHG | HEART RATE: 65 BPM | WEIGHT: 145 LBS | BODY MASS INDEX: 24.16 KG/M2 | OXYGEN SATURATION: 100 % | HEIGHT: 65 IN | DIASTOLIC BLOOD PRESSURE: 78 MMHG

## 2023-07-24 DIAGNOSIS — H10.13 ACUTE ALLERGIC CONJUNCTIVITIS OF BOTH EYES: Primary | ICD-10-CM

## 2023-07-24 PROCEDURE — 99283 EMERGENCY DEPT VISIT LOW MDM: CPT

## 2023-07-24 PROCEDURE — 6370000000 HC RX 637 (ALT 250 FOR IP)

## 2023-07-24 PROCEDURE — 6360000002 HC RX W HCPCS

## 2023-07-24 RX ORDER — FAMOTIDINE 20 MG/1
40 TABLET, FILM COATED ORAL
Status: COMPLETED | OUTPATIENT
Start: 2023-07-24 | End: 2023-07-24

## 2023-07-24 RX ORDER — DEXAMETHASONE SODIUM PHOSPHATE 10 MG/ML
10 INJECTION, SOLUTION INTRAMUSCULAR; INTRAVENOUS
Status: COMPLETED | OUTPATIENT
Start: 2023-07-24 | End: 2023-07-24

## 2023-07-24 RX ORDER — OLOPATADINE HYDROCHLORIDE 2 MG/ML
1 SOLUTION/ DROPS OPHTHALMIC DAILY
Qty: 1 EACH | Refills: 0 | Status: SHIPPED | OUTPATIENT
Start: 2023-07-24

## 2023-07-24 RX ADMIN — DEXAMETHASONE SODIUM PHOSPHATE 10 MG: 10 INJECTION, SOLUTION INTRAMUSCULAR; INTRAVENOUS at 19:47

## 2023-07-24 RX ADMIN — FAMOTIDINE 40 MG: 20 TABLET, FILM COATED ORAL at 19:47

## 2023-07-24 ASSESSMENT — VISUAL ACUITY: OU: 1

## 2023-07-24 NOTE — ED PROVIDER NOTES
0.2 % SOLN ophthalmic solution Place 1 drop into both eyes daily 1 each 0    erythromycin (ROMYCIN) 5 MG/GM ophthalmic ointment Place half-inch ribbon to lower eyelids twice daily. 1 g 0    cetirizine (ZYRTEC) 10 MG tablet Take 1 tablet by mouth daily 30 tablet 0    dexamethasone (DECADRON) 1 MG tablet TAKE 1 TABLET BY MOUTH ONCE AT 11PM 1 tablet 0    allopurinol (ZYLOPRIM) 100 MG tablet Take 1 tablet by mouth daily      amLODIPine (NORVASC) 5 MG tablet Take 1 tablet by mouth daily      betamethasone dipropionate 0.05 % ointment APPLY A THIN FILM TO THE AFFECTED AREAS ON THE ARMS TWICE DAILY X 2 WEEKS.  DO NOT APPLY TO THE FACE      hydrocortisone 2.5 % cream Apply topically 2 times daily      methIMAzole (TAPAZOLE) 5 MG tablet ONE PILL A DAY      metoprolol succinate (TOPROL XL) 25 MG extended release tablet Take 1 tablet by mouth daily      methIMAzole (TAPAZOLE) 5 MG tablet TAKE 1 TABLET BY MOUTH EVERY DAY 90 tablet 3    amLODIPine (NORVASC) 5 MG tablet Take 1 tablet by mouth daily      metoprolol succinate (TOPROL XL) 25 MG extended release tablet Take 1 tablet by mouth daily      allopurinol (ZYLOPRIM) 100 MG tablet Take 1 tablet by mouth daily      losartan (COZAAR) 50 MG tablet One pill a day   stop 100 mg dose   future refills to pcp 90 tablet 0       Social Determinants of Health:   Social Determinants of Health     Tobacco Use: Medium Risk    Smoking Tobacco Use: Former    Smokeless Tobacco Use: Never    Passive Exposure: Not on file   Alcohol Use: Not At Risk    Frequency of Alcohol Consumption: Never    Average Number of Drinks: Patient does not drink    Frequency of Binge Drinking: Never   Financial Resource Strain: Not on file   Food Insecurity: Not on file   Transportation Needs: Not on file   Physical Activity: Not on file   Stress: Not on file   Social Connections: Not on file   Intimate Partner Violence: Not on file   Depression: Not on file   Housing Stability: Not on file       PHYSICAL EXAM

## 2023-07-24 NOTE — ED TRIAGE NOTES
Patient complains of eye swelling, seen in ED a couple weeks ago for same complaint.  Requesting eye drops

## 2023-08-04 ENCOUNTER — HOSPITAL ENCOUNTER (EMERGENCY)
Facility: HOSPITAL | Age: 68
Discharge: HOME | End: 2023-08-04
Payer: MEDICARE

## 2023-08-04 ENCOUNTER — APPOINTMENT (OUTPATIENT)
Facility: HOSPITAL | Age: 68
End: 2023-08-04
Payer: MEDICARE

## 2023-08-04 VITALS
RESPIRATION RATE: 16 BRPM | OXYGEN SATURATION: 100 % | BODY MASS INDEX: 25.69 KG/M2 | TEMPERATURE: 98.3 F | HEIGHT: 63 IN | HEART RATE: 63 BPM | DIASTOLIC BLOOD PRESSURE: 83 MMHG | WEIGHT: 145 LBS | SYSTOLIC BLOOD PRESSURE: 141 MMHG

## 2023-08-04 DIAGNOSIS — L03.213 PRESEPTAL CELLULITIS: Primary | ICD-10-CM

## 2023-08-04 PROCEDURE — 6360000004 HC RX CONTRAST MEDICATION: Performed by: NURSE PRACTITIONER

## 2023-08-04 PROCEDURE — 6370000000 HC RX 637 (ALT 250 FOR IP): Performed by: NURSE PRACTITIONER

## 2023-08-04 PROCEDURE — 70482 CT ORBIT/EAR/FOSSA W/O&W/DYE: CPT

## 2023-08-04 PROCEDURE — 99285 EMERGENCY DEPT VISIT HI MDM: CPT

## 2023-08-04 RX ORDER — LOSARTAN POTASSIUM 50 MG/1
TABLET ORAL
Qty: 90 TABLET | Refills: 0 | Status: SHIPPED | OUTPATIENT
Start: 2023-08-04

## 2023-08-04 RX ORDER — SULFAMETHOXAZOLE AND TRIMETHOPRIM 800; 160 MG/1; MG/1
1 TABLET ORAL 2 TIMES DAILY
Qty: 14 TABLET | Refills: 0 | Status: SHIPPED | OUTPATIENT
Start: 2023-08-04 | End: 2023-08-05 | Stop reason: SDUPTHER

## 2023-08-04 RX ORDER — AMOXICILLIN AND CLAVULANATE POTASSIUM 875; 125 MG/1; MG/1
1 TABLET, FILM COATED ORAL
Status: COMPLETED | OUTPATIENT
Start: 2023-08-04 | End: 2023-08-04

## 2023-08-04 RX ORDER — SULFAMETHOXAZOLE AND TRIMETHOPRIM 800; 160 MG/1; MG/1
1 TABLET ORAL EVERY 12 HOURS SCHEDULED
Status: DISCONTINUED | OUTPATIENT
Start: 2023-08-04 | End: 2023-08-05 | Stop reason: HOSPADM

## 2023-08-04 RX ORDER — TETRACAINE HYDROCHLORIDE 5 MG/ML
1 SOLUTION OPHTHALMIC ONCE
Status: COMPLETED | OUTPATIENT
Start: 2023-08-04 | End: 2023-08-04

## 2023-08-04 RX ORDER — AMOXICILLIN AND CLAVULANATE POTASSIUM 875; 125 MG/1; MG/1
1 TABLET, FILM COATED ORAL 2 TIMES DAILY
Qty: 14 TABLET | Refills: 0 | Status: SHIPPED | OUTPATIENT
Start: 2023-08-04 | End: 2023-08-05 | Stop reason: SDUPTHER

## 2023-08-04 RX ADMIN — FLUORESCEIN SODIUM 1 MG: 1 STRIP OPHTHALMIC at 19:47

## 2023-08-04 RX ADMIN — SULFAMETHOXAZOLE AND TRIMETHOPRIM 1 TABLET: 800; 160 TABLET ORAL at 22:47

## 2023-08-04 RX ADMIN — AMOXICILLIN AND CLAVULANATE POTASSIUM 1 TABLET: 875; 125 TABLET, FILM COATED ORAL at 22:47

## 2023-08-04 RX ADMIN — TETRACAINE HYDROCHLORIDE 1 DROP: 5 SOLUTION OPHTHALMIC at 19:47

## 2023-08-04 RX ADMIN — IOPAMIDOL 100 ML: 755 INJECTION, SOLUTION INTRAVENOUS at 21:46

## 2023-08-04 ASSESSMENT — PAIN - FUNCTIONAL ASSESSMENT: PAIN_FUNCTIONAL_ASSESSMENT: 0-10

## 2023-08-04 ASSESSMENT — PAIN SCALES - GENERAL: PAINLEVEL_OUTOF10: 10

## 2023-08-04 NOTE — ED TRIAGE NOTES
Pt seen recently for bilateral conjunctivitis. State she was seen for this before and his rx helped but symptoms have returned. Both eyes are swollen, running and crusty.  Vision unaffected

## 2023-08-05 RX ORDER — SULFAMETHOXAZOLE AND TRIMETHOPRIM 800; 160 MG/1; MG/1
1 TABLET ORAL 2 TIMES DAILY
Qty: 14 TABLET | Refills: 0 | Status: SHIPPED | OUTPATIENT
Start: 2023-08-05 | End: 2023-08-12

## 2023-08-05 RX ORDER — AMOXICILLIN AND CLAVULANATE POTASSIUM 875; 125 MG/1; MG/1
1 TABLET, FILM COATED ORAL 2 TIMES DAILY
Qty: 14 TABLET | Refills: 0 | Status: SHIPPED | OUTPATIENT
Start: 2023-08-05 | End: 2023-08-12

## 2023-08-05 NOTE — ED NOTES
Pt voices understanding of all dc and follow up instructions as well as use of new rx x 2. Amb to exit wo diff.         Lakisha Meadows RN  08/04/23 [id number]

## 2023-08-05 NOTE — ED PROVIDER NOTES
[facility] EMERGENCY DEPT  EMERGENCY DEPARTMENT HISTORY AND PHYSICAL EXAM      Date: 8/4/2023  Patient Name: Taisha Carrasco  MRN: [id number]  [address] [facility] [place]: 1955  Date of evaluation: 8/4/2023  Provider: MENDEZ Kimble NP   Note Started: 10:13 PM EDT 8/4/23    HISTORY OF PRESENT ILLNESS     Chief Complaint   Patient presents with    Eye Problem       History Provided By: Patient    HPI: Taisha Carrasco is a 80 y.o. male with a history of hep C and thyroid tumor presents with continued bilateral eye redness, eye drainage and itching. Symptoms have been ongoing for the last month and progressively getting worse with new onset swelling around his eyes. He has been to the ED multiple times and has received antibiotic eye ointment on 6/25/2023 and allergic eyedrops on 6/27/2023. Patient reports minimal relief with either treatment. Patient has scheduled appointment with PCP later this month. Denies trauma, foreign bodies, fevers, difficulty breathing, chest pain, neck pain, headache, dizziness, pain with EOMs. He denies any visual disturbances or photophobia. He does not wear contact lenses. PAST MEDICAL HISTORY   Past Medical History:  Past Medical History:   Diagnosis Date    Hepatitis C     Tumor, thyroid        Past Surgical History:  No past surgical history on file. Family History:  No family history on file.     Social History:  Social History     Tobacco Use    Smoking status: Former    Smokeless tobacco: Never    Tobacco comments:     Quit smoking: quit 12 years ago   Substance Use Topics    Alcohol use: Not Currently    Drug use: Yes     Types: Marijuana Salli Endo)       Allergies:  No Known Allergies    PCP: Anna Cesar MD    Current Meds:   Current Facility-Administered Medications   Medication Dose Route Frequency Provider Last Rate Last Admin    sulfamethoxazole-trimethoprim (BACTRIM DS;SEPTRA DS) 800-160 MG per tablet 1 tablet  1 tablet Oral 2 times per day MENDEZ Schmid NP   1

## 2023-10-28 ENCOUNTER — HOSPITAL ENCOUNTER (EMERGENCY)
Facility: HOSPITAL | Age: 68
Discharge: HOME | End: 2023-10-28
Payer: MEDICARE

## 2023-10-28 VITALS
TEMPERATURE: 98.8 F | DIASTOLIC BLOOD PRESSURE: 70 MMHG | RESPIRATION RATE: 18 BRPM | HEART RATE: 74 BPM | WEIGHT: 145 LBS | SYSTOLIC BLOOD PRESSURE: 113 MMHG | BODY MASS INDEX: 25.69 KG/M2 | OXYGEN SATURATION: 98 %

## 2023-10-28 DIAGNOSIS — J06.9 VIRAL URI WITH COUGH: Primary | ICD-10-CM

## 2023-10-28 PROCEDURE — 99283 EMERGENCY DEPT VISIT LOW MDM: CPT

## 2023-10-28 RX ORDER — HYDROXYZINE HYDROCHLORIDE 25 MG/1
25 TABLET, FILM COATED ORAL EVERY 8 HOURS PRN
Qty: 30 TABLET | Refills: 0 | Status: SHIPPED | OUTPATIENT
Start: 2023-10-28 | End: 2023-11-07

## 2023-10-28 RX ORDER — PREDNISONE 50 MG/1
50 TABLET ORAL DAILY
Qty: 5 TABLET | Refills: 0 | Status: SHIPPED | OUTPATIENT
Start: 2023-10-28 | End: 2023-11-02

## 2023-10-28 ASSESSMENT — LIFESTYLE VARIABLES
HOW MANY STANDARD DRINKS CONTAINING ALCOHOL DO YOU HAVE ON A TYPICAL DAY: PATIENT DOES NOT DRINK
HOW OFTEN DO YOU HAVE A DRINK CONTAINING ALCOHOL: NEVER

## 2023-10-28 NOTE — ED PROVIDER NOTES
[facility] EMERGENCY DEPT  EMERGENCY DEPARTMENT HISTORY AND PHYSICAL EXAM      Date: 10/28/2023  Patient Name: Rio Peterson  MRN: [id number]  [address] [facility] 1955  Date of evaluation: 10/28/2023  Provider: MENDEZ Smith NP   Note Started: 3:30 PM EDT 10/28/23    HISTORY OF PRESENT ILLNESS     Chief Complaint   Patient presents with    Congestion    Urticaria       History Provided By: Patient    HPI: Rio Peterson is a 80 y.o. male with past medical history as listed below, presents ambulatory to the emergency department with complaints of dry itchy skin for months and productive cough for 4 days. Of note, his wife was recently sick with a cough as well. Denies fever/chills, chest pain, difficulty breathing, nausea/vomiting, abdominal pain, back pain, and is otherwise in his usual state of health. Upon arrival to the ED pt is alert and oriented x 3, well-appearing, and interacting appropriately; no obvious distress noted. PAST MEDICAL HISTORY   Past Medical History:  Past Medical History:   Diagnosis Date    Hepatitis C     Tumor, thyroid        Past Surgical History:  History reviewed. No pertinent surgical history. Family History:  History reviewed. No pertinent family history. Social History:  Social History     Tobacco Use    Smoking status: Former    Smokeless tobacco: Never    Tobacco comments:     Quit smoking: quit 12 years ago   Substance Use Topics    Alcohol use: Not Currently    Drug use: Yes     Types: Marijuana Graciella Muzzy)       Allergies:  No Known Allergies    PCP: Brandi Lantigua MD    Current Meds:   No current facility-administered medications for this encounter.      Current Outpatient Medications   Medication Sig Dispense Refill    hydrOXYzine HCl (ATARAX) 25 MG tablet Take 1 tablet by mouth every 8 hours as needed for Itching 30 tablet 0    predniSONE (DELTASONE) 50 MG tablet Take 1 tablet by mouth daily for 5 days 5 tablet 0    Dextromethorphan-guaiFENesin  MG CAPS extended release tablet  Commonly known as: TOPROL XL     * metoprolol succinate 25 MG extended release tablet  Commonly known as: TOPROL XL     olopatadine 0.2 % Soln ophthalmic solution  Commonly known as: PATADAY  Place 1 drop into both eyes daily           * This list has 8 medication(s) that are the same as other medications prescribed for you. Read the directions carefully, and ask your doctor or other care provider to review them with you. Where to Get Your Medications        These medications were sent to [address] [place] [address], [address] [place] [address], [address] [place], [place] [id number]      Phone: [phone]   Dextromethorphan-guaiFENesin  MG Caps  hydrOXYzine HCl 25 MG tablet  predniSONE 50 MG tablet           DISCONTINUED MEDICATIONS:  Current Discharge Medication List          I am the Primary Clinician of Record: MENDEZ Cleveland NP (electronically signed)    (Please note that parts of this dictation were completed with voice recognition software. Quite often unanticipated grammatical, syntax, homophones, and other interpretive errors are inadvertently transcribed by the computer software. Please disregards these errors.  Please excuse any errors that have escaped final proofreading.)     MENDEZ Cleveland NP  10/28/23 2917

## 2023-10-30 DIAGNOSIS — E05.20 THYROTOXICOSIS WITH TOXIC MULTINODULAR GOITER AND WITHOUT THYROID STORM: Primary | ICD-10-CM

## 2023-11-01 RX ORDER — METHIMAZOLE 5 MG/1
TABLET ORAL
Qty: 90 TABLET | Refills: 3 | Status: SHIPPED | OUTPATIENT
Start: 2023-11-01

## 2023-11-02 RX ORDER — LOSARTAN POTASSIUM 50 MG/1
TABLET ORAL
Qty: 90 TABLET | Refills: 0 | Status: SHIPPED | OUTPATIENT
Start: 2023-11-02

## 2024-01-24 ENCOUNTER — OFFICE VISIT (OUTPATIENT)
Age: 69
End: 2024-01-24
Payer: MEDICARE

## 2024-01-24 VITALS
HEART RATE: 68 BPM | OXYGEN SATURATION: 98 % | SYSTOLIC BLOOD PRESSURE: 110 MMHG | DIASTOLIC BLOOD PRESSURE: 80 MMHG | HEIGHT: 63 IN | BODY MASS INDEX: 23.71 KG/M2 | RESPIRATION RATE: 20 BRPM | WEIGHT: 133.8 LBS

## 2024-01-24 DIAGNOSIS — E05.20 THYROTOXICOSIS WITH TOXIC MULTINODULAR GOITER AND WITHOUT THYROID STORM: ICD-10-CM

## 2024-01-24 DIAGNOSIS — E05.80 OTHER THYROTOXICOSIS WITHOUT THYROTOXIC CRISIS OR STORM: Primary | ICD-10-CM

## 2024-01-24 DIAGNOSIS — E27.8 ADRENAL NODULE (HCC): ICD-10-CM

## 2024-01-24 DIAGNOSIS — E04.1 NONTOXIC SINGLE THYROID NODULE: ICD-10-CM

## 2024-01-24 PROCEDURE — G8427 DOCREV CUR MEDS BY ELIG CLIN: HCPCS | Performed by: INTERNAL MEDICINE

## 2024-01-24 PROCEDURE — G8484 FLU IMMUNIZE NO ADMIN: HCPCS | Performed by: INTERNAL MEDICINE

## 2024-01-24 PROCEDURE — 1036F TOBACCO NON-USER: CPT | Performed by: INTERNAL MEDICINE

## 2024-01-24 PROCEDURE — 3017F COLORECTAL CA SCREEN DOC REV: CPT | Performed by: INTERNAL MEDICINE

## 2024-01-24 PROCEDURE — G8420 CALC BMI NORM PARAMETERS: HCPCS | Performed by: INTERNAL MEDICINE

## 2024-01-24 PROCEDURE — 1123F ACP DISCUSS/DSCN MKR DOCD: CPT | Performed by: INTERNAL MEDICINE

## 2024-01-24 PROCEDURE — 99215 OFFICE O/P EST HI 40 MIN: CPT | Performed by: INTERNAL MEDICINE

## 2024-01-24 RX ORDER — DEXAMETHASONE 1 MG
TABLET ORAL
Qty: 1 TABLET | Refills: 0 | Status: SHIPPED | OUTPATIENT
Start: 2024-01-24

## 2024-01-24 RX ORDER — METHIMAZOLE 5 MG/1
TABLET ORAL
Qty: 45 TABLET | Refills: 3 | Status: SHIPPED | OUTPATIENT
Start: 2024-01-24

## 2024-01-24 NOTE — PROGRESS NOTES
[facility] DIABETES AND ENDOCRINOLOGY              Namrata Munoz MD FACE     HISTORY OF PRESENT ILLNESS  Brijesh Camacho is a 68 y.o. male.  HPI   follow up after last   visit for thyroid nodule / hyper thyroidism     AND    adrenal nodule     from  last  visit   May 2023    He has his ex-wife accompanying him today   He had hernial surgery in between      He had some urination issues post op and will be seeing urologist next month         May 2023     No new symptoms   He drives taxi   He had COVID in  Dec 2022  -  showed incidental  adrenal nodule         Referred by pcp   Accompanied by wife who is providing better history   Pt has been found to be losing weight and he had diarrhea , severe   Pt lives separate from his wife and son   When labs are checked, found to be hyperthyroid and got started on tapazole 5 mg tid   Pt has hepatitis c - just came to know about it   Yet to start on treatment         review of Systems   Constitutional: Negative.    Psychiatric/Behavioral: Negative for depression and memory loss. The patient does not have insomnia.          Physical Exam   Constitutional: He is oriented to person, place, and time. He appears well-developed and well-nourished.   Neck: Normal range of motion. Neck supple. Thyromegaly present.   Psychiatric: He has a normal mood and affect.         Labs    Lab Results   Component Value Date     07/20/2023    K 4.1 07/20/2023     07/20/2023    CO2 30 07/20/2023    BUN 24 (H) 07/20/2023    CREATININE 1.28 07/20/2023    GLUCOSE 112 (H) 07/20/2023    CALCIUM 9.8 07/20/2023    PROT 7.2 07/20/2023    LABALBU 4.1 07/20/2023    BILITOT 0.4 07/20/2023    ALKPHOS 65 07/20/2023    AST 15 07/20/2023    ALT 28 07/20/2023    LABGLOM >60 07/20/2023    GFRAA >60 03/09/2021    AGRATIO 1.3 07/20/2023    GLOB 3.1 07/20/2023       Thyroid    Lab Results   Component Value Date    TSH 4.410 04/01/2022    LHH6DNO 4.47 (H) 07/20/2023    T4FREE 0.9 07/20/2023

## 2024-01-24 NOTE — PATIENT INSTRUCTIONS
SPECIFIC INSTRUCTIONS BELOW       Change  Tapazole  5 mg   to every other day     ---------------------------------------------------------------------------------------------------------      First do labs for baseline - June 2024  , fasting     Second   test  -  You pick a day of your choice and take the dexamethasone  tablets  1  mg dose one pill   At 11 pm    Next day before 9 AM, go  to get another lab draw done . ( lab print out  says  - second set )        -------------------------------------------------------------------    Instructions for salivary cortisol test     1. Do not brush teeth or floss  before collecting specimen.  2. Do not eat or drink for 30 minutes prior to specimen collection.  3. 24 hours before collection - do not use any creams or lotion that contains steroids such as         hydrocortisone or use any steroid inhalers.These products may contaminate the absorbent.  4. Avoid activities that may cause gum bleeding before collection        Night 1- Put the absorbent pad under your tongue between 11p-midnight for four (4) minutes, label with name, date of birth, collection date and collection time. Place pad in the freezer.     Night 5- Put the absorbent pad under your tongue between 11p-midnight for four (4) minutes, label with name, date of birth, collection date and collection time. Place pad in the freezer.      Absorbent pads are to remain frozen until you can drop them off to [facility] with orders or our lab in the office.          -------------PAY ATTENTION TO THESE GENERAL INSTRUCTIONS -----------------      - The medications prescribed at this visit will not be available at pharmacy until 6 pm       - YOUR MED LIST IS NOT UP TO DATE AS SOME CHANGES ARE BEING MADE AFTER THE VISIT - FOLLOW SPECIFIC INSTRUCTIONS  ABOVE     -ANY tests other than blood work, which you opt to do  outside the  [facility] imaging facilities, you are responsible for prior authorizations if  required    -

## 2024-01-24 NOTE — PROGRESS NOTES
Blood pressure 110/80, pulse 68, resp. rate 20, height 1.6 m (5' 3\"), weight 60.7 kg (133 lb 12.8 oz), SpO2 98 %.    No

## 2024-03-11 ENCOUNTER — HOSPITAL ENCOUNTER (EMERGENCY)
Facility: HOSPITAL | Age: 69
Discharge: HOME | End: 2024-03-11
Payer: MEDICARE

## 2024-03-11 VITALS
BODY MASS INDEX: 23.04 KG/M2 | DIASTOLIC BLOOD PRESSURE: 74 MMHG | SYSTOLIC BLOOD PRESSURE: 112 MMHG | TEMPERATURE: 98 F | HEART RATE: 68 BPM | OXYGEN SATURATION: 100 % | RESPIRATION RATE: 18 BRPM | WEIGHT: 130 LBS | HEIGHT: 63 IN

## 2024-03-11 DIAGNOSIS — R15.9 FECAL SOILING DUE TO FECAL INCONTINENCE: ICD-10-CM

## 2024-03-11 DIAGNOSIS — R19.7 DIARRHEA, UNSPECIFIED TYPE: Primary | ICD-10-CM

## 2024-03-11 PROCEDURE — 99283 EMERGENCY DEPT VISIT LOW MDM: CPT

## 2024-03-11 RX ORDER — PSYLLIUM SEED (WITH DEXTROSE)
3.4 POWDER (GRAM) ORAL DAILY
Qty: 100 G | Refills: 0 | Status: SHIPPED | OUTPATIENT
Start: 2024-03-11 | End: 2024-04-09

## 2024-03-11 ASSESSMENT — PAIN - FUNCTIONAL ASSESSMENT
PAIN_FUNCTIONAL_ASSESSMENT: NONE - DENIES PAIN
PAIN_FUNCTIONAL_ASSESSMENT: NONE - DENIES PAIN

## 2024-03-11 ASSESSMENT — LIFESTYLE VARIABLES
HOW MANY STANDARD DRINKS CONTAINING ALCOHOL DO YOU HAVE ON A TYPICAL DAY: 1 OR 2
HOW OFTEN DO YOU HAVE A DRINK CONTAINING ALCOHOL: MONTHLY OR LESS

## 2024-03-11 NOTE — ED PROVIDER NOTES
were completed with voice recognition software. Quite often unanticipated grammatical, syntax, homophones, and other interpretive errors are inadvertently transcribed by the computer software. Please disregards these errors. Please excuse any errors that have escaped final proofreading.)     Aj Rosario PA-C  03/12/24 0209

## 2024-03-11 NOTE — ED TRIAGE NOTES
Reports he has had diarrhea for months ( more than 6 months).Reports he has talked to PCP and was told it was meds. Reports one med stopped but diarrhea continues

## 2024-07-08 ENCOUNTER — OFFICE VISIT (OUTPATIENT)
Facility: CLINIC | Age: 69
End: 2024-07-08
Payer: MEDICARE

## 2024-07-08 VITALS
BODY MASS INDEX: 25.34 KG/M2 | TEMPERATURE: 98.2 F | RESPIRATION RATE: 18 BRPM | HEIGHT: 63 IN | OXYGEN SATURATION: 98 % | WEIGHT: 143 LBS | HEART RATE: 60 BPM | SYSTOLIC BLOOD PRESSURE: 123 MMHG | DIASTOLIC BLOOD PRESSURE: 75 MMHG

## 2024-07-08 DIAGNOSIS — Z13.1 DIABETES MELLITUS SCREENING: ICD-10-CM

## 2024-07-08 DIAGNOSIS — Z00.00 ENCOUNTER FOR MEDICAL EXAMINATION TO ESTABLISH CARE: ICD-10-CM

## 2024-07-08 DIAGNOSIS — I10 PRIMARY HYPERTENSION: Primary | ICD-10-CM

## 2024-07-08 DIAGNOSIS — E05.90 HYPERTHYROIDISM: ICD-10-CM

## 2024-07-08 DIAGNOSIS — Z13.6 ENCOUNTER FOR LIPID SCREENING FOR CARDIOVASCULAR DISEASE: ICD-10-CM

## 2024-07-08 DIAGNOSIS — Z13.220 ENCOUNTER FOR LIPID SCREENING FOR CARDIOVASCULAR DISEASE: ICD-10-CM

## 2024-07-08 DIAGNOSIS — Z86.19 HISTORY OF HEPATITIS C VIRUS INFECTION: ICD-10-CM

## 2024-07-08 PROCEDURE — 1123F ACP DISCUSS/DSCN MKR DOCD: CPT

## 2024-07-08 PROCEDURE — 99204 OFFICE O/P NEW MOD 45 MIN: CPT

## 2024-07-08 PROCEDURE — 3017F COLORECTAL CA SCREEN DOC REV: CPT

## 2024-07-08 PROCEDURE — 3078F DIAST BP <80 MM HG: CPT

## 2024-07-08 PROCEDURE — 1036F TOBACCO NON-USER: CPT

## 2024-07-08 PROCEDURE — 3074F SYST BP LT 130 MM HG: CPT

## 2024-07-08 PROCEDURE — G8428 CUR MEDS NOT DOCUMENT: HCPCS

## 2024-07-08 PROCEDURE — G8419 CALC BMI OUT NRM PARAM NOF/U: HCPCS

## 2024-07-08 RX ORDER — LORATADINE 10 MG/1
10 TABLET ORAL DAILY PRN
COMMUNITY
Start: 2023-11-13

## 2024-07-08 RX ORDER — HYDROXYZINE HYDROCHLORIDE 25 MG/1
TABLET, FILM COATED ORAL
COMMUNITY

## 2024-07-08 SDOH — ECONOMIC STABILITY: FOOD INSECURITY: WITHIN THE PAST 12 MONTHS, YOU WORRIED THAT YOUR FOOD WOULD RUN OUT BEFORE YOU GOT MONEY TO BUY MORE.: NEVER TRUE

## 2024-07-08 SDOH — ECONOMIC STABILITY: FOOD INSECURITY: WITHIN THE PAST 12 MONTHS, THE FOOD YOU BOUGHT JUST DIDN'T LAST AND YOU DIDN'T HAVE MONEY TO GET MORE.: NEVER TRUE

## 2024-07-08 SDOH — ECONOMIC STABILITY: HOUSING INSECURITY
IN THE LAST 12 MONTHS, WAS THERE A TIME WHEN YOU DID NOT HAVE A STEADY PLACE TO SLEEP OR SLEPT IN A SHELTER (INCLUDING NOW)?: NO

## 2024-07-08 SDOH — ECONOMIC STABILITY: INCOME INSECURITY: HOW HARD IS IT FOR YOU TO PAY FOR THE VERY BASICS LIKE FOOD, HOUSING, MEDICAL CARE, AND HEATING?: NOT HARD AT ALL

## 2024-07-08 ASSESSMENT — ENCOUNTER SYMPTOMS
DIARRHEA: 1
VOMITING: 0
CONSTIPATION: 0
SHORTNESS OF BREATH: 0
COUGH: 0
BACK PAIN: 0
NAUSEA: 0
ABDOMINAL PAIN: 0

## 2024-07-08 ASSESSMENT — PATIENT HEALTH QUESTIONNAIRE - PHQ9
SUM OF ALL RESPONSES TO PHQ9 QUESTIONS 1 & 2: 0
1. LITTLE INTEREST OR PLEASURE IN DOING THINGS: NOT AT ALL
SUM OF ALL RESPONSES TO PHQ QUESTIONS 1-9: 0
2. FEELING DOWN, DEPRESSED OR HOPELESS: NOT AT ALL

## 2024-07-08 NOTE — PROGRESS NOTES
[address] [address]  Tel: [phone]     Chief Complaint   Patient presents with    New Patient       Follow-up  Colonoscopy  Endocrinology  Outside records  Hepatitis C  AWV    History of Present Illness:  Brijesh Camacho is a 69 y.o. male with a PMHx of Hepatits C, hyperthyroidism, and HTN who presents to clinic to establish care.    Was treated for Hepatitis C 1 year ago by his PCP, states that he took medication for several months.    Schedule for colonoscopy on . Has had on and off diarrhea for months now. No known associations.    Patient has history of hyperthyroidism, currently taking methimazole 5 QOD, being seen by Dr. Munoz.    # Chronic HTN:  - Current meds: Amlodipine 5mg and metoprolol XR 25mg  - Compliant with home meds: Yes  - does not check BP at home  - Smoking: Former, quit 15 years ago, previous 37 pack/year history  - EtOH: Social  - Sleep: sleeping well at night  - Denies symptoms of hypo- and severe hypertension (severe headaches, changes to vision, changes to hearing, focal deficits, n/v)    Mom was a diabetic,  of cancer of lymph nodes. Dad had a bad heart.    Past Medical History:   Diagnosis Date    Hepatitis C     Hypertension     Tumor, thyroid        Current Outpatient Medications   Medication Sig Dispense Refill    hydrOXYzine HCl (ATARAX) 25 MG tablet TAKE 1 TABLET BY MOUTH EVERY EIGHT (8) HOURS AS NEEDED for 30      loratadine (CLARITIN) 10 MG tablet Take 1 tablet by mouth daily as needed      methIMAzole (TAPAZOLE) 5 MG tablet TAKE 1 TABLET BY MOUTH EVERY OTHER DAY 45 tablet 3    amLODIPine (NORVASC) 5 MG tablet Take 1 tablet by mouth daily      metoprolol succinate (TOPROL XL) 25 MG extended release tablet Take 1 tablet by mouth daily      dexAMETHasone (DECADRON) 1 MG tablet Take 1 tab by mouth at 11 PM the night before AM labs. Have labs drawn between 7:30-8:30 am the next morning. (Patient not taking: Reported on 2024) 1

## 2024-07-08 NOTE — PROGRESS NOTES
I saw and evaluated the patient, performing the key elements of the service on the date of service.  I discussed the findings, assessment and plan with the resident and agree with the resident's findings and plan as documented in the resident's note.     Grisel Copeland MD 7/8/2024

## 2024-07-08 NOTE — ASSESSMENT & PLAN NOTE
Patient with history of hyperthyroidism presenting today without complaint of symptoms at this time. Patient sees endocrinology, who appear to be working him up for a cortisol disorder, patient has been unable to obtain labs.  - Will continue to follow with endocrinology

## 2024-07-08 NOTE — PROGRESS NOTES
Chief Complaint   Patient presents with    New Patient         \"Have you been to the ER, urgent care clinic since your last visit?  Hospitalized since your last visit?\"    NO    “Have you seen or consulted any other health care providers outside of [facility] since your last visit?”    NO        “Have you had a colorectal cancer screening such as a colonoscopy/FIT/Cologuard?    NO SCHEDULED 7/19/24.    No colonoscopy on file  No cologuard on file  No FIT/FOBT on file   No flexible sigmoidoscopy on file         Click Here for Release of Records Request     Health Maintenance Due   Topic Date Due    COVID-19 Vaccine (1) Never done    Depression Screen  Never done    DTaP/Tdap/Td vaccine (1 - Tdap) Never done    Colorectal Cancer Screen  Never done    Shingles vaccine (1 of 2) Never done    Respiratory Syncytial Virus (RSV) Pregnant or age 60 yrs+ (1 - 1-dose 60+ series) Never done    Pneumococcal 65+ years Vaccine (1 of 1 - PCV) Never done    Diabetes screen  10/01/2022    Annual Wellness Visit (Medicare Advantage)  Never done

## 2024-07-08 NOTE — ASSESSMENT & PLAN NOTE
Patient presenting for new patient examination with history of HTN. Patient doing well on current medications, vitals indicate well controlled and without bradycardia ISO metoprolol use. Patient denies any symptoms of hyper/hypotension.  - Continue Amlodipine and Metoprolol

## 2024-07-08 NOTE — ASSESSMENT & PLAN NOTE
Patient diagnosed and treated by previous PCP, states that he was told he has been cured.  - Request previous PCP records  - F/u as needed

## 2024-07-09 LAB
ALBUMIN SERPL-MCNC: 4.4 G/DL (ref 3.5–5)
ALBUMIN/GLOB SERPL: 1.3 (ref 1.1–2.2)
ALP SERPL-CCNC: 72 U/L (ref 45–117)
ALT SERPL-CCNC: 67 U/L (ref 12–78)
ANION GAP SERPL CALC-SCNC: 5 MMOL/L (ref 5–15)
AST SERPL-CCNC: 37 U/L (ref 15–37)
BASOPHILS # BLD: 0 K/UL (ref 0–0.1)
BASOPHILS NFR BLD: 1 % (ref 0–1)
BILIRUB SERPL-MCNC: 0.6 MG/DL (ref 0.2–1)
BUN SERPL-MCNC: 20 MG/DL (ref 6–20)
BUN/CREAT SERPL: 16 (ref 12–20)
CALCIUM SERPL-MCNC: 10.1 MG/DL (ref 8.5–10.1)
CHLORIDE SERPL-SCNC: 104 MMOL/L (ref 97–108)
CHOLEST SERPL-MCNC: 183 MG/DL
CO2 SERPL-SCNC: 29 MMOL/L (ref 21–32)
CREAT SERPL-MCNC: 1.28 MG/DL (ref 0.7–1.3)
DIFFERENTIAL METHOD BLD: ABNORMAL
EOSINOPHIL # BLD: 0.9 K/UL (ref 0–0.4)
EOSINOPHIL NFR BLD: 13 % (ref 0–7)
ERYTHROCYTE [DISTWIDTH] IN BLOOD BY AUTOMATED COUNT: 13.6 % (ref 11.5–14.5)
EST. AVERAGE GLUCOSE BLD GHB EST-MCNC: 105 MG/DL
GLOBULIN SER CALC-MCNC: 3.5 G/DL (ref 2–4)
GLUCOSE SERPL-MCNC: 93 MG/DL (ref 65–100)
HBA1C MFR BLD: 5.3 % (ref 4–5.6)
HCT VFR BLD AUTO: 45.3 % (ref 36.6–50.3)
HDLC SERPL-MCNC: 30 MG/DL
HDLC SERPL: 6.1 (ref 0–5)
HGB BLD-MCNC: 14.4 G/DL (ref 12.1–17)
IMM GRANULOCYTES # BLD AUTO: 0 K/UL (ref 0–0.04)
IMM GRANULOCYTES NFR BLD AUTO: 0 % (ref 0–0.5)
LDLC SERPL CALC-MCNC: ABNORMAL MG/DL (ref 0–100)
LDLC SERPL DIRECT ASSAY-MCNC: 84 MG/DL (ref 0–100)
LYMPHOCYTES # BLD: 2.3 K/UL (ref 0.8–3.5)
LYMPHOCYTES NFR BLD: 33 % (ref 12–49)
MCH RBC QN AUTO: 31.4 PG (ref 26–34)
MCHC RBC AUTO-ENTMCNC: 31.8 G/DL (ref 30–36.5)
MCV RBC AUTO: 98.7 FL (ref 80–99)
MONOCYTES # BLD: 0.8 K/UL (ref 0–1)
MONOCYTES NFR BLD: 12 % (ref 5–13)
NEUTS SEG # BLD: 2.8 K/UL (ref 1.8–8)
NEUTS SEG NFR BLD: 41 % (ref 32–75)
NRBC # BLD: 0 K/UL (ref 0–0.01)
NRBC BLD-RTO: 0 PER 100 WBC
PLATELET # BLD AUTO: 209 K/UL (ref 150–400)
PMV BLD AUTO: 10.5 FL (ref 8.9–12.9)
POTASSIUM SERPL-SCNC: 4.1 MMOL/L (ref 3.5–5.1)
PROT SERPL-MCNC: 7.9 G/DL (ref 6.4–8.2)
RBC # BLD AUTO: 4.59 M/UL (ref 4.1–5.7)
SODIUM SERPL-SCNC: 138 MMOL/L (ref 136–145)
TRIGL SERPL-MCNC: 495 MG/DL
VLDLC SERPL CALC-MCNC: ABNORMAL MG/DL
WBC # BLD AUTO: 6.8 K/UL (ref 4.1–11.1)

## 2024-07-11 NOTE — RESULT ENCOUNTER NOTE
Patient lipid panel resulted with severely elevated triglcyerides. Suspect 2/2 non-fasting status of patient, would like to recheck this level.  CBC with presence of eosinophilia. No previous records of such. Patient does have complaint of diarrhea which may be contributory, planning to see patient next month, will recheck and evaluate.  LDL 84  A1c 5.3, no DM/Pre-DM present  CMP WNL

## 2024-07-12 ENCOUNTER — TELEPHONE (OUTPATIENT)
Age: 69
End: 2024-07-12

## 2024-07-15 NOTE — PRE-PROCEDURE INSTRUCTIONS
Pat completed with patient for procedure on 7/19/24. Patient verbalized understanding of arrival time of 0830, npo status, prep instructions, and states his wife will drive him home.

## 2024-07-16 ENCOUNTER — TELEPHONE (OUTPATIENT)
Facility: CLINIC | Age: 69
End: 2024-07-16

## 2024-07-16 DIAGNOSIS — I10 PRIMARY HYPERTENSION: Primary | ICD-10-CM

## 2024-07-16 RX ORDER — METOPROLOL SUCCINATE 25 MG/1
25 TABLET, EXTENDED RELEASE ORAL DAILY
Qty: 90 TABLET | Refills: 3 | Status: SHIPPED | OUTPATIENT
Start: 2024-07-16

## 2024-07-18 ENCOUNTER — ANESTHESIA EVENT (OUTPATIENT)
Facility: HOSPITAL | Age: 69
End: 2024-07-18
Payer: MEDICARE

## 2024-07-18 NOTE — ANESTHESIA PRE PROCEDURE
Department of Anesthesiology  Preprocedure Note       Name:  Brijesh Camacho   Age:  69 y.o.  :  1955                                          MRN:  [id number]         Date:  2024      Surgeon: Surgeon(s):  Kg Sevilla MD    Procedure: Procedure(s):  ESOPHAGOGASTRODUODENOSCOPY, COLONOSOCPY  COLONOSCOPY DIAGNOSTIC    Medications prior to admission:   Prior to Admission medications    Medication Sig Start Date End Date Taking? Authorizing Provider   metoprolol succinate (TOPROL XL) 25 MG extended release tablet Take 1 tablet by mouth daily 24   Medardo Napier MD   hydrOXYzine HCl (ATARAX) 25 MG tablet TAKE 1 TABLET BY MOUTH EVERY EIGHT (8) HOURS AS NEEDED for 30    Ben Gallo MD   loratadine (CLARITIN) 10 MG tablet Take 1 tablet by mouth daily as needed 23   Ben Gallo MD   methIMAzole (TAPAZOLE) 5 MG tablet TAKE 1 TABLET BY MOUTH EVERY OTHER DAY 24   Namrata Munoz MD   amLODIPine (NORVASC) 5 MG tablet Take 1 tablet by mouth daily 23   Ben Gallo MD   allopurinol (ZYLOPRIM) 100 MG tablet Take 1 tablet by mouth daily  Patient not taking: Reported on 2024    Ben Gallo MD       Current medications:    No current facility-administered medications for this encounter.     Current Outpatient Medications   Medication Sig Dispense Refill    metoprolol succinate (TOPROL XL) 25 MG extended release tablet Take 1 tablet by mouth daily 90 tablet 3    hydrOXYzine HCl (ATARAX) 25 MG tablet TAKE 1 TABLET BY MOUTH EVERY EIGHT (8) HOURS AS NEEDED for 30      loratadine (CLARITIN) 10 MG tablet Take 1 tablet by mouth daily as needed      methIMAzole (TAPAZOLE) 5 MG tablet TAKE 1 TABLET BY MOUTH EVERY OTHER DAY 45 tablet 3    amLODIPine (NORVASC) 5 MG tablet Take 1 tablet by mouth daily      allopurinol (ZYLOPRIM) 100 MG tablet Take 1 tablet by mouth daily (Patient not taking: Reported on 2024)         Allergies:  No Known Allergies    Problem

## 2024-07-19 ENCOUNTER — HOSPITAL ENCOUNTER (OUTPATIENT)
Facility: HOSPITAL | Age: 69
Setting detail: OUTPATIENT SURGERY
Discharge: HOME | End: 2024-07-19
Attending: INTERNAL MEDICINE | Admitting: INTERNAL MEDICINE
Payer: MEDICARE

## 2024-07-19 ENCOUNTER — ANESTHESIA (OUTPATIENT)
Facility: HOSPITAL | Age: 69
End: 2024-07-19
Payer: MEDICARE

## 2024-07-19 VITALS
BODY MASS INDEX: 23.92 KG/M2 | TEMPERATURE: 97.5 F | WEIGHT: 135 LBS | RESPIRATION RATE: 16 BRPM | DIASTOLIC BLOOD PRESSURE: 85 MMHG | OXYGEN SATURATION: 98 % | HEIGHT: 63 IN | HEART RATE: 60 BPM | SYSTOLIC BLOOD PRESSURE: 150 MMHG

## 2024-07-19 DIAGNOSIS — R19.7 DIARRHEA, UNSPECIFIED TYPE: ICD-10-CM

## 2024-07-19 DIAGNOSIS — Z12.11 SPECIAL SCREENING FOR MALIGNANT NEOPLASMS, COLON: ICD-10-CM

## 2024-07-19 DIAGNOSIS — K21.9 GASTROESOPHAGEAL REFLUX DISEASE, UNSPECIFIED WHETHER ESOPHAGITIS PRESENT: ICD-10-CM

## 2024-07-19 DIAGNOSIS — R63.4 LOSS OF WEIGHT: ICD-10-CM

## 2024-07-19 PROCEDURE — 88305 TISSUE EXAM BY PATHOLOGIST: CPT

## 2024-07-19 PROCEDURE — 2500000003 HC RX 250 WO HCPCS: Performed by: NURSE ANESTHETIST, CERTIFIED REGISTERED

## 2024-07-19 PROCEDURE — 3700000001 HC ADD 15 MINUTES (ANESTHESIA): Performed by: INTERNAL MEDICINE

## 2024-07-19 PROCEDURE — 7100000011 HC PHASE II RECOVERY - ADDTL 15 MIN: Performed by: INTERNAL MEDICINE

## 2024-07-19 PROCEDURE — 7100000010 HC PHASE II RECOVERY - FIRST 15 MIN: Performed by: INTERNAL MEDICINE

## 2024-07-19 PROCEDURE — 6360000002 HC RX W HCPCS: Performed by: NURSE ANESTHETIST, CERTIFIED REGISTERED

## 2024-07-19 PROCEDURE — 2709999900 HC NON-CHARGEABLE SUPPLY: Performed by: INTERNAL MEDICINE

## 2024-07-19 PROCEDURE — 2580000003 HC RX 258: Performed by: INTERNAL MEDICINE

## 2024-07-19 PROCEDURE — 3600007503: Performed by: INTERNAL MEDICINE

## 2024-07-19 PROCEDURE — 3700000000 HC ANESTHESIA ATTENDED CARE: Performed by: INTERNAL MEDICINE

## 2024-07-19 PROCEDURE — 3600007513: Performed by: INTERNAL MEDICINE

## 2024-07-19 RX ORDER — PROPOFOL 10 MG/ML
INJECTION, EMULSION INTRAVENOUS
Status: COMPLETED
Start: 2024-07-19 | End: 2024-07-19

## 2024-07-19 RX ORDER — LIDOCAINE HYDROCHLORIDE 20 MG/ML
INJECTION, SOLUTION EPIDURAL; INFILTRATION; INTRACAUDAL; PERINEURAL
Status: COMPLETED
Start: 2024-07-19 | End: 2024-07-19

## 2024-07-19 RX ORDER — SODIUM CHLORIDE, SODIUM LACTATE, POTASSIUM CHLORIDE, CALCIUM CHLORIDE 600; 310; 30; 20 MG/100ML; MG/100ML; MG/100ML; MG/100ML
INJECTION, SOLUTION INTRAVENOUS CONTINUOUS
Status: DISCONTINUED | OUTPATIENT
Start: 2024-07-19 | End: 2024-07-19 | Stop reason: HOSPADM

## 2024-07-19 RX ADMIN — SODIUM CHLORIDE, POTASSIUM CHLORIDE, SODIUM LACTATE AND CALCIUM CHLORIDE: 600; 310; 30; 20 INJECTION, SOLUTION INTRAVENOUS at 10:49

## 2024-07-19 RX ADMIN — PROPOFOL 30 MG: 10 INJECTION, EMULSION INTRAVENOUS at 11:01

## 2024-07-19 RX ADMIN — PROPOFOL 30 MG: 10 INJECTION, EMULSION INTRAVENOUS at 10:55

## 2024-07-19 RX ADMIN — PROPOFOL 80 MG: 10 INJECTION, EMULSION INTRAVENOUS at 10:51

## 2024-07-19 RX ADMIN — PROPOFOL 30 MG: 10 INJECTION, EMULSION INTRAVENOUS at 11:07

## 2024-07-19 RX ADMIN — LIDOCAINE HYDROCHLORIDE 80 MG: 20 INJECTION, SOLUTION EPIDURAL; INFILTRATION; INTRACAUDAL; PERINEURAL at 10:51

## 2024-07-19 ASSESSMENT — PAIN - FUNCTIONAL ASSESSMENT
PAIN_FUNCTIONAL_ASSESSMENT: 0-10

## 2024-07-19 NOTE — ANESTHESIA POSTPROCEDURE EVALUATION
Department of Anesthesiology  Postprocedure Note    Patient: Brijesh Camacho  MRN: [id number]  YOB: 1955  Date of evaluation: 7/19/2024    Procedure Summary       Date: 07/19/24 Room / Location: [facility] ENDO 03 / [facility]    Anesthesia Start: 1049 Anesthesia Stop: 1112    Procedures:       ESOPHAGOGASTRODUODENOSCOPY, COLONOSOCPY (Upper GI Region)      COLONOSCOPY DIAGNOSTIC (Lower GI Region) Diagnosis:       Gastroesophageal reflux disease, unspecified whether esophagitis present      Special screening for malignant neoplasms, colon      Diarrhea, unspecified type      Loss of weight      (Gastroesophageal reflux disease, unspecified whether esophagitis present [K21.9])      (Special screening for malignant neoplasms, colon [Z12.11])      (Diarrhea, unspecified type [R19.7])      (Loss of weight [R63.4])    Surgeons: Kg Sevilla MD Responsible Provider: Ronnie Dyer MD    Anesthesia Type: MAC ASA Status: 3            Anesthesia Type: MAC    Cammie Phase I: Cammie Score: 10    Cammie Phase II:      Anesthesia Post Evaluation    Patient location during evaluation: bedside  Patient participation: complete - patient participated  Level of consciousness: lethargic  Pain score: 0  Airway patency: patent  Nausea & Vomiting: no nausea and no vomiting  Cardiovascular status: hemodynamically stable  Respiratory status: acceptable  Hydration status: stable  Comments: VSS. Report to RN. Remains on stretcher  Pain management: adequate        No notable events documented.

## 2024-07-19 NOTE — DISCHARGE INSTRUCTIONS
-  Await pathology results.         -  Continue present medications.      -  High fiber diet.         -  Continue present medications            Return to normal activities tomorrow.           -  Return to my office in 3 months.         -  Repeat colonoscopy in 5-10 years for screening purposes.

## 2024-07-19 NOTE — PERIOP NOTE
Patient states okay to review and give discharge instructions to  Estela Camacho    Patient stated he had an 8oz  cup of black coffee at 0630 this morning with his medications. Anesthesia notified by JUDY Garvin from Endoscopy.

## 2024-07-19 NOTE — PERIOP NOTE
Patient alert and oriented x 4 with respirations even and unlabored on RA. Patient discharged home per physician's order. Patient accompanied by spouse transported via wheelchair to hospital cafeteria per patient and pt's spouse's request.

## 2024-07-25 ENCOUNTER — OFFICE VISIT (OUTPATIENT)
Age: 69
End: 2024-07-25
Payer: MEDICARE

## 2024-07-25 VITALS
HEART RATE: 68 BPM | DIASTOLIC BLOOD PRESSURE: 81 MMHG | SYSTOLIC BLOOD PRESSURE: 145 MMHG | WEIGHT: 141.8 LBS | TEMPERATURE: 97.2 F | OXYGEN SATURATION: 95 % | BODY MASS INDEX: 25.12 KG/M2

## 2024-07-25 DIAGNOSIS — E05.20 THYROTOXICOSIS WITH TOXIC MULTINODULAR GOITER AND WITHOUT THYROID STORM: ICD-10-CM

## 2024-07-25 DIAGNOSIS — E27.8 ADRENAL NODULE (HCC): Primary | ICD-10-CM

## 2024-07-25 DIAGNOSIS — E04.1 NONTOXIC SINGLE THYROID NODULE: ICD-10-CM

## 2024-07-25 PROCEDURE — 99214 OFFICE O/P EST MOD 30 MIN: CPT | Performed by: INTERNAL MEDICINE

## 2024-07-25 PROCEDURE — G8427 DOCREV CUR MEDS BY ELIG CLIN: HCPCS | Performed by: INTERNAL MEDICINE

## 2024-07-25 PROCEDURE — 3079F DIAST BP 80-89 MM HG: CPT | Performed by: INTERNAL MEDICINE

## 2024-07-25 PROCEDURE — G8419 CALC BMI OUT NRM PARAM NOF/U: HCPCS | Performed by: INTERNAL MEDICINE

## 2024-07-25 PROCEDURE — 1123F ACP DISCUSS/DSCN MKR DOCD: CPT | Performed by: INTERNAL MEDICINE

## 2024-07-25 PROCEDURE — 1036F TOBACCO NON-USER: CPT | Performed by: INTERNAL MEDICINE

## 2024-07-25 PROCEDURE — 3017F COLORECTAL CA SCREEN DOC REV: CPT | Performed by: INTERNAL MEDICINE

## 2024-07-25 PROCEDURE — 3077F SYST BP >= 140 MM HG: CPT | Performed by: INTERNAL MEDICINE

## 2024-07-25 RX ORDER — DEXAMETHASONE 1 MG
TABLET ORAL
Qty: 1 TABLET | Refills: 0 | Status: SHIPPED | OUTPATIENT
Start: 2024-07-25

## 2024-07-25 NOTE — PROGRESS NOTES
[facility] DIABETES AND ENDOCRINOLOGY              Namrata Munoz MD FACE     HISTORY OF PRESENT ILLNESS  Brijesh Camcaho is a 69     y.o. male.  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    Psychiatric/Behavioral: Negative for depression and memory loss. The patient does not have insomnia.          Physical Exam   Constitutional: He is oriented to person, place, and time. He appears well-developed and well-nourished.   Neck: Normal range of motion. Neck supple. Thyromegaly present.   Psychiatric: He has a normal mood and affect.         Labs    Lab Results   Component Value Date     07/08/2024    K 4.1 07/08/2024     07/08/2024    CO2 29 07/08/2024    BUN 20 07/08/2024    CREATININE 1.28 07/08/2024    GLUCOSE 93 07/08/2024    CALCIUM 10.1 07/08/2024    BILITOT 0.6 07/08/2024    ALKPHOS 72 07/08/2024    AST 37 07/08/2024    ALT 67 07/08/2024    LABGLOM 61 07/08/2024    GFRAA >60 03/09/2021    AGRATIO 1.1 12/26/2022    GLOB 3.5 07/08/2024    CHOL 183 07/08/2024    TRIG 495 (H) 07/08/2024    LDL Not calculated due to elevated

## 2024-07-25 NOTE — PATIENT INSTRUCTIONS
SPECIFIC INSTRUCTIONS BELOW        Tapazole  5 mg   to every other day     ---------------------------------------------------------------------------------------------------------      First do labs for baseline - within next 10 days   , fasting     Second   test  -  You pick a day of your choice and take the dexamethasone  tablets  1  mg dose one pill   At 11 pm    Next day before 9 AM, go  to get another lab draw done . ( lab print out  says  - second set )        The pill has been sent to local pharmacy      -------------------------------------------------------------------------------------    Call scheduling for thyroid ultrasound         ----------------------------------------------------------------    Gave lab requests   for  next year   and   LABELED them             -------------PAY ATTENTION TO THESE GENERAL INSTRUCTIONS -----------------      - The medications prescribed at this visit will not be available at pharmacy until 6 pm       - YOUR MED LIST IS NOT UP TO DATE AS SOME CHANGES ARE BEING MADE AFTER THE VISIT - FOLLOW SPECIFIC INSTRUCTIONS  ABOVE     -ANY tests other than blood work, which you opt to do  outside the  [facility] imaging facilities, you are responsible for prior authorizations if  required    - HEALTH MAINTENANCE IS NOT GOING TO BE UP TO DATE ON YOUR AVS- PLEASE IGNORE     Results     *Normal results will not be notified by a phone call starting January 1 2021   *If you have an upcoming visit, the results will be discussed at the visit   *Please sign up for MY CHART if you want access to your lab and test results  *Abnormal results which require immediate attention will be notified by phone call   *Abnormal results which do not require immediate assistance will be notified in 1-2 weeks       Refills    -    have your pharmacy send us a refill request . Refills are done max for one year and a visit is a must before refills are extended    Follow up appointments -  highly

## 2024-07-29 DIAGNOSIS — M1A.9XX0 CHRONIC GOUT WITHOUT TOPHUS, UNSPECIFIED CAUSE, UNSPECIFIED SITE: Primary | ICD-10-CM

## 2024-07-29 RX ORDER — ALLOPURINOL 100 MG/1
100 TABLET ORAL DAILY
Qty: 90 TABLET | Refills: 0 | Status: SHIPPED | OUTPATIENT
Start: 2024-07-29

## 2024-08-16 RX ORDER — AMLODIPINE BESYLATE 5 MG/1
5 TABLET ORAL DAILY
Qty: 90 TABLET | Refills: 0 | Status: SHIPPED | OUTPATIENT
Start: 2024-08-16

## 2024-09-09 ENCOUNTER — OFFICE VISIT (OUTPATIENT)
Facility: CLINIC | Age: 69
End: 2024-09-09
Payer: MEDICARE

## 2024-09-09 VITALS
TEMPERATURE: 98.4 F | SYSTOLIC BLOOD PRESSURE: 137 MMHG | HEIGHT: 63 IN | BODY MASS INDEX: 25.69 KG/M2 | HEART RATE: 66 BPM | OXYGEN SATURATION: 97 % | WEIGHT: 145 LBS | DIASTOLIC BLOOD PRESSURE: 82 MMHG | RESPIRATION RATE: 16 BRPM

## 2024-09-09 DIAGNOSIS — E78.2 MIXED HYPERLIPIDEMIA: Primary | ICD-10-CM

## 2024-09-09 PROCEDURE — G8428 CUR MEDS NOT DOCUMENT: HCPCS

## 2024-09-09 PROCEDURE — 99213 OFFICE O/P EST LOW 20 MIN: CPT

## 2024-09-09 PROCEDURE — 1036F TOBACCO NON-USER: CPT

## 2024-09-09 PROCEDURE — 3075F SYST BP GE 130 - 139MM HG: CPT

## 2024-09-09 PROCEDURE — 3079F DIAST BP 80-89 MM HG: CPT

## 2024-09-09 PROCEDURE — G8419 CALC BMI OUT NRM PARAM NOF/U: HCPCS

## 2024-09-09 PROCEDURE — 3017F COLORECTAL CA SCREEN DOC REV: CPT

## 2024-09-09 PROCEDURE — 1123F ACP DISCUSS/DSCN MKR DOCD: CPT

## 2024-09-09 RX ORDER — ATORVASTATIN CALCIUM 10 MG/1
10 TABLET, FILM COATED ORAL DAILY
Qty: 90 TABLET | Refills: 3 | Status: SHIPPED | OUTPATIENT
Start: 2024-09-09

## 2024-09-09 RX ORDER — ATORVASTATIN CALCIUM 10 MG/1
TABLET, FILM COATED ORAL
COMMUNITY
Start: 2024-09-04 | End: 2024-09-09 | Stop reason: SDUPTHER

## 2024-09-10 LAB
CHOLEST SERPL-MCNC: 175 MG/DL
HDLC SERPL-MCNC: 33 MG/DL
HDLC SERPL: 5.3 (ref 0–5)
LDLC SERPL CALC-MCNC: 65.2 MG/DL (ref 0–100)
TRIGL SERPL-MCNC: 384 MG/DL
VLDLC SERPL CALC-MCNC: 76.8 MG/DL

## 2024-09-11 PROBLEM — E78.2 MIXED HYPERLIPIDEMIA: Status: ACTIVE | Noted: 2024-09-11

## 2024-09-12 DIAGNOSIS — E78.2 MIXED HYPERLIPIDEMIA: Primary | ICD-10-CM

## 2024-09-13 DIAGNOSIS — E27.8 ADRENAL NODULE (HCC): ICD-10-CM

## 2024-09-25 LAB — DEXAMETHASONE SERPL-MCNC: 596 NG/DL

## 2024-09-26 ENCOUNTER — TELEPHONE (OUTPATIENT)
Age: 69
End: 2024-09-26

## 2024-09-26 NOTE — TELEPHONE ENCOUNTER
----- Message from Dr. Namrata Munoz MD sent at 9/26/2024  8:26 AM EDT -----  AM cortisol is not seen   Check with Amira pt must have gone  to  outside  [facility]

## 2024-11-19 DIAGNOSIS — M1A.9XX0 CHRONIC GOUT WITHOUT TOPHUS, UNSPECIFIED CAUSE, UNSPECIFIED SITE: ICD-10-CM

## 2024-11-20 RX ORDER — ALLOPURINOL 100 MG/1
100 TABLET ORAL DAILY
Qty: 90 TABLET | Refills: 3 | Status: SHIPPED | OUTPATIENT
Start: 2024-11-20

## 2024-11-26 ENCOUNTER — OFFICE VISIT (OUTPATIENT)
Facility: CLINIC | Age: 69
End: 2024-11-26

## 2024-11-26 VITALS
OXYGEN SATURATION: 97 % | SYSTOLIC BLOOD PRESSURE: 101 MMHG | HEART RATE: 69 BPM | WEIGHT: 142.4 LBS | BODY MASS INDEX: 25.23 KG/M2 | TEMPERATURE: 98.1 F | RESPIRATION RATE: 18 BRPM | DIASTOLIC BLOOD PRESSURE: 64 MMHG | HEIGHT: 63 IN

## 2024-11-26 DIAGNOSIS — J40 BRONCHITIS: ICD-10-CM

## 2024-11-26 DIAGNOSIS — Z87.891 PERSONAL HISTORY OF TOBACCO USE: Primary | ICD-10-CM

## 2024-11-26 RX ORDER — GUAIFENESIN 600 MG/1
1200 TABLET, EXTENDED RELEASE ORAL 2 TIMES DAILY
Qty: 40 TABLET | Refills: 0 | Status: SHIPPED | OUTPATIENT
Start: 2024-11-26 | End: 2024-12-06

## 2024-11-26 NOTE — PROGRESS NOTES
[facility]   [facility]    Subjective:  CC:   Chief Complaint   Patient presents with    Follow-up     Follow up from ED-Bronchitis       HPI:  Brijesh Camacho is 69 y.o. male who presents today for ER follow up.     ER - Bronchitis  - Feeling better  - Not sure if they did imaging  - Was not prescribed anything  - Went to [place]    Objective:    Vitals:    11/26/24 1353   BP: 101/64   Site: Left Upper Arm   Position: Sitting   Cuff Size: Medium Adult   Pulse: 69   Resp: 18   Temp: 98.1 °F (36.7 °C)   TempSrc: Oral   SpO2: 97%   Weight: 64.6 kg (142 lb 6.4 oz)   Height: 1.6 m (5' 3\")       Physical Exam  Vitals and nursing note reviewed.   Constitutional:       General: He is not in acute distress.     Appearance: He is not ill-appearing.   HENT:      Head: Normocephalic and atraumatic.      Right Ear: External ear normal.      Left Ear: External ear normal.      Nose: Nose normal.      Mouth/Throat:      Mouth: Mucous membranes are moist.      Pharynx: Oropharynx is clear.   Eyes:      Extraocular Movements: Extraocular movements intact.      Conjunctiva/sclera: Conjunctivae normal.   Cardiovascular:      Rate and Rhythm: Normal rate and regular rhythm.   Pulmonary:      Effort: Pulmonary effort is normal.      Breath sounds: Normal breath sounds. No wheezing, rhonchi or rales.   Musculoskeletal:      Cervical back: Normal range of motion and neck supple.   Lymphadenopathy:      Cervical: No cervical adenopathy.   Neurological:      Mental Status: He is alert.         No results found for this or any previous visit (from the past 12 hour(s)).  All imaging and labs ordered in clinic personally reviewed by me.     Assessment and Plan:    Bronchitis - Feeling much better. Still has some phlegm. Sent Mucinex.   HM - Agrees to lung cancer screening with LDCT.     Follow up: No follow-ups on file.      Diagnosis Orders   1. Personal history of tobacco use  TX VISIT TO

## 2024-11-26 NOTE — PATIENT INSTRUCTIONS
follow-up, he or she will help you understand what to do next.  After a lung cancer screening, you can go back to your usual activities right away.  A lung cancer screening test can't tell if you have lung cancer. If your results are positive, your doctor can't tell whether an abnormal finding is a harmless nodule, cancer, or something else without doing more tests.  What can you do to help prevent lung cancer?  Some lung cancers can't be prevented. But if you smoke, quitting smoking is the best step you can take to prevent lung cancer. If you want to quit, your doctor can recommend medicines or other ways to help.  Follow-up care is a key part of your treatment and safety. Be sure to make and go to all appointments, and call your doctor if you are having problems. It's also a good idea to know your test results and keep a list of the medicines you take.  Where can you learn more?  Go to https://www.[email or web address].net/patientEd and enter Q940 to learn more about \"Learning About Lung Cancer Screening.\"  Current as of: October 25, 2023  Content Version: 14.2  © 2024 [employer].   Care instructions adapted under license by [employer]. If you have questions about a medical condition or this instruction, always ask your healthcare professional. [employer] disclaims any warranty or liability for your use of this information.

## 2024-11-26 NOTE — PROGRESS NOTES
\"Have you been to the ER, urgent care clinic since your last visit?  Hospitalized since your last visit?\"    YES; [facility] ED  “Have you seen or consulted any other health care providers outside our system since your last visit?”    NO

## 2024-12-09 ENCOUNTER — HOSPITAL ENCOUNTER (OUTPATIENT)
Facility: HOSPITAL | Age: 69
Discharge: HOME | End: 2024-12-12
Payer: MEDICARE

## 2024-12-09 DIAGNOSIS — Z87.891 PERSONAL HISTORY OF TOBACCO USE: ICD-10-CM

## 2024-12-09 PROCEDURE — 71271 CT THORAX LUNG CANCER SCR C-: CPT

## 2024-12-19 RX ORDER — AMLODIPINE BESYLATE 5 MG/1
TABLET ORAL
Qty: 90 TABLET | Refills: 0 | Status: SHIPPED | OUTPATIENT
Start: 2024-12-19

## 2025-01-09 DIAGNOSIS — E04.1 NONTOXIC SINGLE THYROID NODULE: ICD-10-CM

## 2025-01-09 DIAGNOSIS — E05.80 OTHER THYROTOXICOSIS WITHOUT THYROTOXIC CRISIS OR STORM: ICD-10-CM

## 2025-01-09 DIAGNOSIS — E05.20 THYROTOXICOSIS WITH TOXIC MULTINODULAR GOITER AND WITHOUT THYROID STORM: ICD-10-CM

## 2025-01-09 DIAGNOSIS — E27.9 ADRENAL NODULE (HCC): ICD-10-CM

## 2025-01-10 RX ORDER — METHIMAZOLE 5 MG/1
TABLET ORAL
Qty: 45 TABLET | Refills: 3 | Status: SHIPPED | OUTPATIENT
Start: 2025-01-10

## 2025-02-25 DIAGNOSIS — M1A.9XX0 CHRONIC GOUT WITHOUT TOPHUS, UNSPECIFIED CAUSE, UNSPECIFIED SITE: ICD-10-CM

## 2025-02-25 DIAGNOSIS — E27.9 ADRENAL NODULE: ICD-10-CM

## 2025-02-25 DIAGNOSIS — E05.20 THYROTOXICOSIS WITH TOXIC MULTINODULAR GOITER AND WITHOUT THYROID STORM: ICD-10-CM

## 2025-02-25 DIAGNOSIS — E05.80 OTHER THYROTOXICOSIS WITHOUT THYROTOXIC CRISIS OR STORM: ICD-10-CM

## 2025-02-25 DIAGNOSIS — E04.1 NONTOXIC SINGLE THYROID NODULE: ICD-10-CM

## 2025-02-25 RX ORDER — ALLOPURINOL 100 MG/1
100 TABLET ORAL DAILY
Qty: 90 TABLET | Refills: 3 | Status: SHIPPED | OUTPATIENT
Start: 2025-02-25

## 2025-02-25 RX ORDER — AMLODIPINE BESYLATE 5 MG/1
5 TABLET ORAL DAILY
Qty: 90 TABLET | Refills: 1 | Status: SHIPPED | OUTPATIENT
Start: 2025-02-25

## 2025-02-26 RX ORDER — METHIMAZOLE 5 MG/1
TABLET ORAL
Qty: 45 TABLET | Refills: 3 | Status: SHIPPED | OUTPATIENT
Start: 2025-02-26

## 2025-03-03 ENCOUNTER — TELEPHONE (OUTPATIENT)
Facility: CLINIC | Age: 70
End: 2025-03-03

## 2025-03-03 DIAGNOSIS — E05.80 OTHER THYROTOXICOSIS WITHOUT THYROTOXIC CRISIS OR STORM: ICD-10-CM

## 2025-03-03 DIAGNOSIS — I10 PRIMARY HYPERTENSION: ICD-10-CM

## 2025-03-03 DIAGNOSIS — E78.2 MIXED HYPERLIPIDEMIA: ICD-10-CM

## 2025-03-03 DIAGNOSIS — E05.20 THYROTOXICOSIS WITH TOXIC MULTINODULAR GOITER AND WITHOUT THYROID STORM: ICD-10-CM

## 2025-03-03 DIAGNOSIS — E27.9 ADRENAL NODULE: ICD-10-CM

## 2025-03-03 DIAGNOSIS — E04.1 NONTOXIC SINGLE THYROID NODULE: ICD-10-CM

## 2025-03-03 RX ORDER — METOPROLOL SUCCINATE 25 MG/1
25 TABLET, EXTENDED RELEASE ORAL DAILY
Qty: 90 TABLET | Refills: 3 | Status: SHIPPED | OUTPATIENT
Start: 2025-03-03

## 2025-03-03 RX ORDER — METHIMAZOLE 5 MG/1
TABLET ORAL
Qty: 45 TABLET | Refills: 3 | Status: SHIPPED | OUTPATIENT
Start: 2025-03-03

## 2025-03-03 RX ORDER — ATORVASTATIN CALCIUM 10 MG/1
10 TABLET, FILM COATED ORAL DAILY
Qty: 90 TABLET | Refills: 3 | Status: SHIPPED | OUTPATIENT
Start: 2025-03-03

## 2025-03-03 NOTE — TELEPHONE ENCOUNTER
metoprolol succinate (TOPROL XL) 25 MG extended release tablet   atorvastatin (LIPITOR) 10 MG tablet    Please send to [facility].

## 2025-07-10 ENCOUNTER — OFFICE VISIT (OUTPATIENT)
Age: 70
End: 2025-07-10
Payer: MEDICARE

## 2025-07-10 VITALS
WEIGHT: 138.4 LBS | HEIGHT: 63 IN | DIASTOLIC BLOOD PRESSURE: 70 MMHG | TEMPERATURE: 98.1 F | HEART RATE: 67 BPM | OXYGEN SATURATION: 98 % | BODY MASS INDEX: 24.52 KG/M2 | SYSTOLIC BLOOD PRESSURE: 125 MMHG

## 2025-07-10 DIAGNOSIS — E27.9 ADRENAL NODULE: ICD-10-CM

## 2025-07-10 DIAGNOSIS — E04.1 NONTOXIC SINGLE THYROID NODULE: Primary | ICD-10-CM

## 2025-07-10 PROCEDURE — 3078F DIAST BP <80 MM HG: CPT | Performed by: INTERNAL MEDICINE

## 2025-07-10 PROCEDURE — 1036F TOBACCO NON-USER: CPT | Performed by: INTERNAL MEDICINE

## 2025-07-10 PROCEDURE — 1126F AMNT PAIN NOTED NONE PRSNT: CPT | Performed by: INTERNAL MEDICINE

## 2025-07-10 PROCEDURE — G8420 CALC BMI NORM PARAMETERS: HCPCS | Performed by: INTERNAL MEDICINE

## 2025-07-10 PROCEDURE — G8427 DOCREV CUR MEDS BY ELIG CLIN: HCPCS | Performed by: INTERNAL MEDICINE

## 2025-07-10 PROCEDURE — 3017F COLORECTAL CA SCREEN DOC REV: CPT | Performed by: INTERNAL MEDICINE

## 2025-07-10 PROCEDURE — 1160F RVW MEDS BY RX/DR IN RCRD: CPT | Performed by: INTERNAL MEDICINE

## 2025-07-10 PROCEDURE — 3074F SYST BP LT 130 MM HG: CPT | Performed by: INTERNAL MEDICINE

## 2025-07-10 PROCEDURE — 1159F MED LIST DOCD IN RCRD: CPT | Performed by: INTERNAL MEDICINE

## 2025-07-10 PROCEDURE — 99214 OFFICE O/P EST MOD 30 MIN: CPT | Performed by: INTERNAL MEDICINE

## 2025-07-10 PROCEDURE — 1123F ACP DISCUSS/DSCN MKR DOCD: CPT | Performed by: INTERNAL MEDICINE

## 2025-07-10 RX ORDER — DEXAMETHASONE 1 MG
TABLET ORAL
Qty: 1 TABLET | Refills: 0 | Status: SHIPPED | OUTPATIENT
Start: 2025-07-10

## 2025-07-10 NOTE — PATIENT INSTRUCTIONS
SPECIFIC INSTRUCTIONS BELOW        Tapazole  5 mg   to every other day     ----------------------------------------------------------------------------------------------    You pick a day of your choice and take the dexamethasone  tablets  1  mg dose one pill   At 11 pm    Next day before 9 AM, go  to get another lab draw done . ( lab print out  says  - second set )        The pill has been sent to local pharmacy      -------------------------------------------------------------------------------------    Call scheduling for thyroid ultrasound

## 2025-07-10 NOTE — PROGRESS NOTES
[facility] DIABETES AND ENDOCRINOLOGY            Namrata Munoz MD FACE       HISTORY OF PRESENT ILLNESS  Brijesh Camacho is a 70    y.o. male.  HPI   follow up after last   visit for thyroid nodule / hyper thyroidism     AND    adrenal nodule     from  last  visit   July 2024    He had COVID in  Dec 2022  -  showed incidental  adrenal nodule     Pt  had flu/ ? COVID - admitted at ER   Compliant with meds   He moved to [place]  and he has forgotten all the labs   Accompanied by wife          July 2024    He has : salivary cortisol \" tubes in hand   He changed to local PCP         Jan 2024    He has his ex-wife accompanying him today   He had hernial surgery in between    He had some urination issues post op and will be seeing urologist next month       May 2023   No new symptoms   He drives taxi   He had COVID in  Dec 2022  -  showed incidental  adrenal nodule       Referred by pcp   Accompanied by wife who is providing better history   Pt has been found to be losing weight and he had diarrhea , severe   Pt lives separate from his wife and son   When labs are checked, found to be hyperthyroid and got started on tapazole 5 mg tid   Pt has hepatitis c - just came to know about it   Yet to start on treatment         review of Systems   Constitutional: Negative.    Psychiatric/Behavioral: Negative for depression and memory loss. The patient does not have insomnia.          Physical Exam   Constitutional: He is oriented to person, place, and time. He appears well-developed and well-nourished.   Neck: Normal range of motion. Neck supple. Thyromegaly present.   Psychiatric: He has a normal mood and affect.         Labs    Missed on them       ASSESSMENT and PLAN    1.  Hyperthyroidism : Graves disease by clinical information. Reviewed [facility].   Could not order  thyroid uptake and scan as he is now on tapazole   July 2024  :  stay on tapazole  5 mg every other day   Jan 2024  :  decrease dose  tapazole   5 mg every

## 2025-07-10 NOTE — PROGRESS NOTES
Brijesh Camacho is a 70 y.o. male here for   Chief Complaint   Patient presents with    Thyroid Problem    Adrenal       1. Have you been to the ER, urgent care clinic since your last visit?  Hospitalized since your last visit? -Er in [place]- [facility]    2. Have you seen or consulted any other health care providers outside of the [facility] since your last visit?  Include any pap smears or colon screening.- no

## 2025-07-11 RX ORDER — AMLODIPINE BESYLATE 5 MG/1
5 TABLET ORAL DAILY
Qty: 90 TABLET | Refills: 1 | Status: SHIPPED | OUTPATIENT
Start: 2025-07-11

## (undated) DEVICE — BITE BLOCK ENDOSCP AD 60 FR W/ ADJ STRP PLAS GRN BLOX

## (undated) DEVICE — MASK ANES INF SZ 2 PREM TAIL VLV INFL PRT UNSCENTED SGL PT

## (undated) DEVICE — LINE SAMPLING ADVANCE ORAL NASAL MICROSTREAM O2 TUBING 6.5'

## (undated) DEVICE — KIT ENDOSCOPIC  PROC VIA

## (undated) DEVICE — MASK O2 MED AD 7 FT 3 IN 1 W/ STD CONN LTX